# Patient Record
Sex: FEMALE | Race: WHITE | NOT HISPANIC OR LATINO | Employment: FULL TIME | URBAN - NONMETROPOLITAN AREA
[De-identification: names, ages, dates, MRNs, and addresses within clinical notes are randomized per-mention and may not be internally consistent; named-entity substitution may affect disease eponyms.]

---

## 2020-03-03 ENCOUNTER — HOSPITAL ENCOUNTER (EMERGENCY)
Facility: HOSPITAL | Age: 45
Discharge: HOME | End: 2020-03-03
Attending: EMERGENCY MEDICINE | Admitting: EMERGENCY MEDICINE

## 2020-03-03 ENCOUNTER — APPOINTMENT (OUTPATIENT)
Dept: GENERAL RADIOLOGY | Facility: HOSPITAL | Age: 45
End: 2020-03-03

## 2020-03-03 VITALS
SYSTOLIC BLOOD PRESSURE: 118 MMHG | HEART RATE: 92 BPM | HEIGHT: 62 IN | RESPIRATION RATE: 18 BRPM | BODY MASS INDEX: 29.44 KG/M2 | TEMPERATURE: 97.5 F | OXYGEN SATURATION: 98 % | DIASTOLIC BLOOD PRESSURE: 72 MMHG | WEIGHT: 160 LBS

## 2020-03-03 DIAGNOSIS — R55 VASOVAGAL EPISODE: ICD-10-CM

## 2020-03-03 DIAGNOSIS — R07.89 CHEST PAIN, ATYPICAL: Primary | ICD-10-CM

## 2020-03-03 LAB
ALBUMIN SERPL-MCNC: 4.54 G/DL (ref 3.5–5.2)
ALBUMIN/GLOB SERPL: 1.5 G/DL
ALP SERPL-CCNC: 96 U/L (ref 39–117)
ALT SERPL W P-5'-P-CCNC: 18 U/L (ref 1–33)
AMYLASE SERPL-CCNC: 33 U/L (ref 28–100)
ANION GAP SERPL CALCULATED.3IONS-SCNC: 13.9 MMOL/L (ref 5–15)
AST SERPL-CCNC: 19 U/L (ref 1–32)
BACTERIA UR QL AUTO: ABNORMAL /HPF
BASOPHILS # BLD AUTO: 0.05 10*3/MM3 (ref 0–0.2)
BASOPHILS NFR BLD AUTO: 0.4 % (ref 0–1.5)
BILIRUB SERPL-MCNC: 0.5 MG/DL (ref 0.2–1.2)
BILIRUB UR QL STRIP: NEGATIVE
BUN BLD-MCNC: 10 MG/DL (ref 6–20)
BUN/CREAT SERPL: 11.8 (ref 7–25)
CALCIUM SPEC-SCNC: 10.2 MG/DL (ref 8.6–10.5)
CHLORIDE SERPL-SCNC: 105 MMOL/L (ref 98–107)
CLARITY UR: CLEAR
CO2 SERPL-SCNC: 24.1 MMOL/L (ref 22–29)
COLOR UR: ABNORMAL
CREAT BLD-MCNC: 0.85 MG/DL (ref 0.57–1)
DEPRECATED RDW RBC AUTO: 42.4 FL (ref 37–54)
EOSINOPHIL # BLD AUTO: 0.16 10*3/MM3 (ref 0–0.4)
EOSINOPHIL NFR BLD AUTO: 1.1 % (ref 0.3–6.2)
ERYTHROCYTE [DISTWIDTH] IN BLOOD BY AUTOMATED COUNT: 12.1 % (ref 12.3–15.4)
GFR SERPL CREATININE-BSD FRML MDRD: 73 ML/MIN/1.73
GLOBULIN UR ELPH-MCNC: 3.1 GM/DL
GLUCOSE BLD-MCNC: 111 MG/DL (ref 65–99)
GLUCOSE UR STRIP-MCNC: NEGATIVE MG/DL
HCT VFR BLD AUTO: 47.5 % (ref 34–46.6)
HGB BLD-MCNC: 16.3 G/DL (ref 12–15.9)
HGB UR QL STRIP.AUTO: ABNORMAL
HOLD SPECIMEN: NORMAL
HOLD SPECIMEN: NORMAL
HYALINE CASTS UR QL AUTO: ABNORMAL /LPF
IMM GRANULOCYTES # BLD AUTO: 0.06 10*3/MM3 (ref 0–0.05)
IMM GRANULOCYTES NFR BLD AUTO: 0.4 % (ref 0–0.5)
KETONES UR QL STRIP: NEGATIVE
LEUKOCYTE ESTERASE UR QL STRIP.AUTO: NEGATIVE
LIPASE SERPL-CCNC: 17 U/L (ref 13–60)
LYMPHOCYTES # BLD AUTO: 1.14 10*3/MM3 (ref 0.7–3.1)
LYMPHOCYTES NFR BLD AUTO: 8.1 % (ref 19.6–45.3)
MCH RBC QN AUTO: 32.8 PG (ref 26.6–33)
MCHC RBC AUTO-ENTMCNC: 34.3 G/DL (ref 31.5–35.7)
MCV RBC AUTO: 95.6 FL (ref 79–97)
MONOCYTES # BLD AUTO: 0.73 10*3/MM3 (ref 0.1–0.9)
MONOCYTES NFR BLD AUTO: 5.2 % (ref 5–12)
NEUTROPHILS # BLD AUTO: 11.9 10*3/MM3 (ref 1.7–7)
NEUTROPHILS NFR BLD AUTO: 84.8 % (ref 42.7–76)
NITRITE UR QL STRIP: NEGATIVE
NRBC BLD AUTO-RTO: 0 /100 WBC (ref 0–0.2)
PH UR STRIP.AUTO: <=5 [PH] (ref 5–8)
PLATELET # BLD AUTO: 229 10*3/MM3 (ref 140–450)
PMV BLD AUTO: 11.5 FL (ref 6–12)
POTASSIUM BLD-SCNC: 4.1 MMOL/L (ref 3.5–5.2)
PROT SERPL-MCNC: 7.6 G/DL (ref 6–8.5)
PROT UR QL STRIP: ABNORMAL
RBC # BLD AUTO: 4.97 10*6/MM3 (ref 3.77–5.28)
RBC # UR: ABNORMAL /HPF
REF LAB TEST METHOD: ABNORMAL
SODIUM BLD-SCNC: 143 MMOL/L (ref 136–145)
SP GR UR STRIP: 1.02 (ref 1–1.03)
SQUAMOUS #/AREA URNS HPF: ABNORMAL /HPF
TROPONIN T SERPL-MCNC: <0.01 NG/ML (ref 0–0.03)
TROPONIN T SERPL-MCNC: <0.01 NG/ML (ref 0–0.03)
UROBILINOGEN UR QL STRIP: ABNORMAL
WBC NRBC COR # BLD: 14.04 10*3/MM3 (ref 3.4–10.8)
WBC UR QL AUTO: ABNORMAL /HPF
WHOLE BLOOD HOLD SPECIMEN: NORMAL
WHOLE BLOOD HOLD SPECIMEN: NORMAL

## 2020-03-03 PROCEDURE — 96361 HYDRATE IV INFUSION ADD-ON: CPT

## 2020-03-03 PROCEDURE — 93010 ELECTROCARDIOGRAM REPORT: CPT | Performed by: INTERNAL MEDICINE

## 2020-03-03 PROCEDURE — 85025 COMPLETE CBC W/AUTO DIFF WBC: CPT | Performed by: EMERGENCY MEDICINE

## 2020-03-03 PROCEDURE — 82150 ASSAY OF AMYLASE: CPT | Performed by: EMERGENCY MEDICINE

## 2020-03-03 PROCEDURE — 93005 ELECTROCARDIOGRAM TRACING: CPT | Performed by: EMERGENCY MEDICINE

## 2020-03-03 PROCEDURE — 84484 ASSAY OF TROPONIN QUANT: CPT | Performed by: EMERGENCY MEDICINE

## 2020-03-03 PROCEDURE — 71045 X-RAY EXAM CHEST 1 VIEW: CPT

## 2020-03-03 PROCEDURE — 71045 X-RAY EXAM CHEST 1 VIEW: CPT | Performed by: RADIOLOGY

## 2020-03-03 PROCEDURE — 25010000002 ONDANSETRON PER 1 MG: Performed by: EMERGENCY MEDICINE

## 2020-03-03 PROCEDURE — 99285 EMERGENCY DEPT VISIT HI MDM: CPT

## 2020-03-03 PROCEDURE — 80053 COMPREHEN METABOLIC PANEL: CPT | Performed by: EMERGENCY MEDICINE

## 2020-03-03 PROCEDURE — 96374 THER/PROPH/DIAG INJ IV PUSH: CPT

## 2020-03-03 PROCEDURE — 83690 ASSAY OF LIPASE: CPT | Performed by: EMERGENCY MEDICINE

## 2020-03-03 PROCEDURE — 96375 TX/PRO/DX INJ NEW DRUG ADDON: CPT

## 2020-03-03 PROCEDURE — 87086 URINE CULTURE/COLONY COUNT: CPT | Performed by: EMERGENCY MEDICINE

## 2020-03-03 PROCEDURE — 81001 URINALYSIS AUTO W/SCOPE: CPT | Performed by: EMERGENCY MEDICINE

## 2020-03-03 RX ORDER — SODIUM CHLORIDE 9 MG/ML
125 INJECTION, SOLUTION INTRAVENOUS CONTINUOUS
Status: DISCONTINUED | OUTPATIENT
Start: 2020-03-03 | End: 2020-03-03 | Stop reason: HOSPADM

## 2020-03-03 RX ORDER — ONDANSETRON 4 MG/1
4 TABLET, ORALLY DISINTEGRATING ORAL EVERY 6 HOURS PRN
Qty: 15 TABLET | Refills: 0 | Status: SHIPPED | OUTPATIENT
Start: 2020-03-03 | End: 2021-10-22

## 2020-03-03 RX ORDER — PANTOPRAZOLE SODIUM 40 MG/10ML
40 INJECTION, POWDER, LYOPHILIZED, FOR SOLUTION INTRAVENOUS ONCE
Status: COMPLETED | OUTPATIENT
Start: 2020-03-03 | End: 2020-03-03

## 2020-03-03 RX ORDER — SODIUM CHLORIDE 0.9 % (FLUSH) 0.9 %
10 SYRINGE (ML) INJECTION AS NEEDED
Status: DISCONTINUED | OUTPATIENT
Start: 2020-03-03 | End: 2020-03-03 | Stop reason: HOSPADM

## 2020-03-03 RX ORDER — PANTOPRAZOLE SODIUM 40 MG/1
40 TABLET, DELAYED RELEASE ORAL DAILY
Qty: 30 TABLET | Refills: 0 | Status: SHIPPED | OUTPATIENT
Start: 2020-03-03

## 2020-03-03 RX ORDER — ONDANSETRON 2 MG/ML
4 INJECTION INTRAMUSCULAR; INTRAVENOUS ONCE
Status: COMPLETED | OUTPATIENT
Start: 2020-03-03 | End: 2020-03-03

## 2020-03-03 RX ORDER — ALUMINA, MAGNESIA, AND SIMETHICONE 2400; 2400; 240 MG/30ML; MG/30ML; MG/30ML
15 SUSPENSION ORAL ONCE
Status: COMPLETED | OUTPATIENT
Start: 2020-03-03 | End: 2020-03-03

## 2020-03-03 RX ORDER — LIDOCAINE HYDROCHLORIDE 20 MG/ML
15 SOLUTION OROPHARYNGEAL ONCE
Status: COMPLETED | OUTPATIENT
Start: 2020-03-03 | End: 2020-03-03

## 2020-03-03 RX ADMIN — LIDOCAINE HYDROCHLORIDE 15 ML: 20 SOLUTION ORAL; TOPICAL at 09:30

## 2020-03-03 RX ADMIN — ONDANSETRON 4 MG: 2 INJECTION INTRAMUSCULAR; INTRAVENOUS at 09:28

## 2020-03-03 RX ADMIN — SODIUM CHLORIDE 125 ML/HR: 9 INJECTION, SOLUTION INTRAVENOUS at 09:28

## 2020-03-03 RX ADMIN — ALUMINUM HYDROXIDE, MAGNESIUM HYDROXIDE, AND DIMETHICONE 15 ML: 400; 400; 40 SUSPENSION ORAL at 09:30

## 2020-03-03 RX ADMIN — PANTOPRAZOLE SODIUM 40 MG: 40 INJECTION, POWDER, FOR SOLUTION INTRAVENOUS at 09:29

## 2020-03-03 RX ADMIN — SODIUM CHLORIDE 1000 ML: 9 INJECTION, SOLUTION INTRAVENOUS at 09:28

## 2020-03-03 NOTE — ED NOTES
Patient presents to the ER with complaints of chest pain that started this morning. Patient states that she woke up this morning and was having some heartburn. Patient states that she was having abdominal pain and began vomiting. Patient denies any chest pain upon arrival. Patient denies cardiac history. Patient is in no distress upon arrival. Patient vitals stable.      Declan Marx RN  03/03/20 [id number]

## 2020-03-03 NOTE — DISCHARGE INSTRUCTIONS
Home in care of family.  Rest.  Drink lots of fluids.  Take your medication as prescribed.  See your family doctor in the next couple days for recheck.  Return the emergency department right away if symptoms worsen/any problems.

## 2020-03-03 NOTE — ED NOTES
Patient states that she is chest pain free upon discharge. Patient and spouse verbalize understanding of discharge teaching. Patient vitals stable at time of discharge. Patient in no distress at time of discharge.      Declan Marx RN  03/03/20 [id number]

## 2020-03-03 NOTE — ED PROVIDER NOTES
"Subjective   Patient is a generally healthy 44-year-old female who presents with a chief complaint of chest pain and near syncope.  She reports that at about 7:00 this morning she developed burning substernal pain that seemed to be coming up from her epigastric area.  This did not radiate elsewhere.  She had no associated shortness of breath, nausea, vomiting, diaphoresis with this.  She then felt like she was about to have diarrhea, went to the bathroom, had a sudden large diarrheal stool, after which she states that she broke out in a cold sweat, got sick at her stomach and vomited, at which time she felt lightheaded as though she may lose consciousness, though she did not lose consciousness.  She feels much better by the time that she arrives here.  She reports mild burning epigastric and substernal discomfort, mild nausea.  She denies other symptoms or other complaints.  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  Patient has no history of diabetes, hypertension, coronary artery disease, or other major medical problems.  She does not take any medications regularly.  She does not smoke.          Review of Systems   Constitutional: Negative for chills and fever.   HENT: Negative for ear pain, sore throat and trouble swallowing.    Eyes: Negative for photophobia and pain.   Respiratory: Negative for shortness of breath and wheezing.    Cardiovascular: Positive for chest pain. Negative for palpitations.   Gastrointestinal: Positive for abdominal pain, nausea and vomiting. Negative for abdominal distention, blood in stool and diarrhea.   Endocrine: Negative for polydipsia and polyphagia.   Genitourinary: Negative for difficulty urinating and flank pain.   Musculoskeletal: Negative for back pain, neck pain and neck stiffness.   Skin: Negative for color change and pallor.   Neurological: Negative for seizures, syncope and speech " difficulty.   Psychiatric/Behavioral: Negative for confusion.   All other systems reviewed and are negative.      No past medical history on file.    No Known Allergies    No past surgical history on file.    No family history on file.    Social History     Socioeconomic History   • Marital status:      Spouse name: Not on file   • Number of children: Not on file   • Years of education: Not on file   • Highest education level: Not on file           Objective   Physical Exam   Constitutional: She is oriented to person, place, and time. She appears well-developed and well-nourished. No distress.   Very pleasant female, in no apparent distress.  She is in good spirits.  She appears well.   HENT:   Head: Normocephalic and atraumatic.   Eyes: Pupils are equal, round, and reactive to light. EOM are normal. No scleral icterus.   Neck: Normal range of motion. Neck supple. No neck rigidity. No tracheal deviation present.   Cardiovascular: Normal rate, regular rhythm and intact distal pulses.   Pulmonary/Chest: Effort normal and breath sounds normal. No respiratory distress. She exhibits no tenderness.   Abdominal: Soft. Bowel sounds are normal. There is no tenderness. There is no rebound and no guarding.   Musculoskeletal: Normal range of motion. She exhibits no tenderness.        Right lower leg: She exhibits no tenderness and no edema.        Left lower leg: She exhibits no tenderness and no edema.   Neurological: She is alert and oriented to person, place, and time. She has normal strength. No sensory deficit. She exhibits normal muscle tone. Coordination normal. GCS eye subscore is 4. GCS verbal subscore is 5. GCS motor subscore is 6.   Skin: Skin is warm and dry. Capillary refill takes less than 2 seconds. No cyanosis. No pallor.   Psychiatric: She has a normal mood and affect. Her behavior is normal.   Nursing note and vitals reviewed.      Procedures  EKG shows sinus rhythm with a rate of 99.  No apparent acute  ischemia.  Xr Chest 1 View    Result Date: 3/3/2020  Narrative: EXAMINATION: XR CHEST 1 VW-  CLINICAL INDICATION:     CP  TECHNIQUE:  XR CHEST 1 VW-  COMPARISON: NONE  FINDINGS: The lungs remain aerated. Heart and mediastinum contours are unremarkable. No pleural effusion. No pneumothorax. Bony and soft tissue structures are unremarkable.      Impression: No radiographic evidence of acute cardiac or pulmonary disease.  This report was finalized on 3/3/2020 9:17 AM by Dr. Ignacio Padron MD.      Results for orders placed or performed during the hospital encounter of 03/03/20   Comprehensive Metabolic Panel   Result Value Ref Range    Glucose 111 (H) 65 - 99 mg/dL    BUN 10 6 - 20 mg/dL    Creatinine 0.85 0.57 - 1.00 mg/dL    Sodium 143 136 - 145 mmol/L    Potassium 4.1 3.5 - 5.2 mmol/L    Chloride 105 98 - 107 mmol/L    CO2 24.1 22.0 - 29.0 mmol/L    Calcium 10.2 8.6 - 10.5 mg/dL    Total Protein 7.6 6.0 - 8.5 g/dL    Albumin 4.54 3.50 - 5.20 g/dL    ALT (SGPT) 18 1 - 33 U/L    AST (SGOT) 19 1 - 32 U/L    Alkaline Phosphatase 96 39 - 117 U/L    Total Bilirubin 0.5 0.2 - 1.2 mg/dL    eGFR Non African Amer 73 >60 mL/min/1.73    Globulin 3.1 gm/dL    A/G Ratio 1.5 g/dL    BUN/Creatinine Ratio 11.8 7.0 - 25.0    Anion Gap 13.9 5.0 - 15.0 mmol/L   Lipase   Result Value Ref Range    Lipase 17 13 - 60 U/L   Amylase   Result Value Ref Range    Amylase 33 28 - 100 U/L   Urinalysis With Microscopic If Indicated (No Culture) - Urine, Clean Catch   Result Value Ref Range    Color, UA Dark Yellow (A) Yellow, Straw    Appearance, UA Clear Clear    pH, UA <=5.0 5.0 - 8.0    Specific Gravity, UA 1.021 1.005 - 1.030    Glucose, UA Negative Negative    Ketones, UA Negative Negative    Bilirubin, UA Negative Negative    Blood, UA Trace (A) Negative    Protein, UA Trace (A) Negative    Leuk Esterase, UA Negative Negative    Nitrite, UA Negative Negative    Urobilinogen, UA 0.2 E.U./dL 0.2 - 1.0 E.U./dL   Troponin   Result Value Ref Range     Troponin T <0.010 0.000 - 0.030 ng/mL   Troponin   Result Value Ref Range    Troponin T <0.010 0.000 - 0.030 ng/mL   CBC Auto Differential   Result Value Ref Range    WBC 14.04 (H) 3.40 - 10.80 10*3/mm3    RBC 4.97 3.77 - 5.28 10*6/mm3    Hemoglobin 16.3 (H) 12.0 - 15.9 g/dL    Hematocrit 47.5 (H) 34.0 - 46.6 %    MCV 95.6 79.0 - 97.0 fL    MCH 32.8 26.6 - 33.0 pg    MCHC 34.3 31.5 - 35.7 g/dL    RDW 12.1 (L) 12.3 - 15.4 %    RDW-SD 42.4 37.0 - 54.0 fl    MPV 11.5 6.0 - 12.0 fL    Platelets 229 140 - 450 10*3/mm3    Neutrophil % 84.8 (H) 42.7 - 76.0 %    Lymphocyte % 8.1 (L) 19.6 - 45.3 %    Monocyte % 5.2 5.0 - 12.0 %    Eosinophil % 1.1 0.3 - 6.2 %    Basophil % 0.4 0.0 - 1.5 %    Immature Grans % 0.4 0.0 - 0.5 %    Neutrophils, Absolute 11.90 (H) 1.70 - 7.00 10*3/mm3    Lymphocytes, Absolute 1.14 0.70 - 3.10 10*3/mm3    Monocytes, Absolute 0.73 0.10 - 0.90 10*3/mm3    Eosinophils, Absolute 0.16 0.00 - 0.40 10*3/mm3    Basophils, Absolute 0.05 0.00 - 0.20 10*3/mm3    Immature Grans, Absolute 0.06 (H) 0.00 - 0.05 10*3/mm3    nRBC 0.0 0.0 - 0.2 /100 WBC   Urinalysis, Microscopic Only - Urine, Clean Catch   Result Value Ref Range    RBC, UA 3-5 (A) None Seen, 0-2 /HPF    WBC, UA 6-12 (A) None Seen, 0-2 /HPF    Bacteria, UA None Seen None Seen /HPF    Squamous Epithelial Cells, UA 0-2 None Seen, 0-2 /HPF    Hyaline Casts, UA 3-6 None Seen /LPF    Methodology Automated Microscopy    Light Blue Top   Result Value Ref Range    Extra Tube hold for add-on    Green Top (Gel)   Result Value Ref Range    Extra Tube Hold for add-ons.    Lavender Top   Result Value Ref Range    Extra Tube hold for add-on    Gold Top - SST   Result Value Ref Range    Extra Tube Hold for add-ons.                   ED Course  Patient's emergency department stay has been uneventful.  Never has she shown any signs of distress.  Patient, her  and I discussed all of her test results and her plan of care.  They voice understanding and  agreement.                                           MDM    Final diagnoses:   Chest pain, atypical   Vasovagal episode             Please note that portions of this note were completed with a voice recognition program.        Adam Mendes MD  03/03/20 [id number]

## 2020-03-04 LAB — BACTERIA SPEC AEROBE CULT: NORMAL

## 2020-12-15 ENCOUNTER — HOSPITAL ENCOUNTER (OUTPATIENT)
Dept: MAMMOGRAPHY | Facility: HOSPITAL | Age: 45
Discharge: HOME | End: 2020-12-15
Admitting: INTERNAL MEDICINE

## 2020-12-15 DIAGNOSIS — Z12.31 VISIT FOR SCREENING MAMMOGRAM: ICD-10-CM

## 2020-12-15 PROCEDURE — 77067 SCR MAMMO BI INCL CAD: CPT

## 2020-12-15 PROCEDURE — 77067 SCR MAMMO BI INCL CAD: CPT | Performed by: RADIOLOGY

## 2020-12-15 PROCEDURE — 77063 BREAST TOMOSYNTHESIS BI: CPT

## 2020-12-15 PROCEDURE — 77063 BREAST TOMOSYNTHESIS BI: CPT | Performed by: RADIOLOGY

## 2021-03-18 ENCOUNTER — BULK ORDERING (OUTPATIENT)
Dept: CASE MANAGEMENT | Facility: OTHER | Age: 46
End: 2021-03-18

## 2021-03-18 DIAGNOSIS — Z23 IMMUNIZATION DUE: ICD-10-CM

## 2021-10-05 ENCOUNTER — TRANSCRIBE ORDERS (OUTPATIENT)
Dept: ADMINISTRATIVE | Facility: HOSPITAL | Age: 46
End: 2021-10-05

## 2021-10-05 DIAGNOSIS — R10.9 ABDOMINAL PAIN, UNSPECIFIED ABDOMINAL LOCATION: Primary | ICD-10-CM

## 2021-10-20 ENCOUNTER — HOSPITAL ENCOUNTER (OUTPATIENT)
Dept: ULTRASOUND IMAGING | Facility: HOSPITAL | Age: 46
Discharge: HOME | End: 2021-10-20
Admitting: INTERNAL MEDICINE

## 2021-10-20 DIAGNOSIS — R10.9 ABDOMINAL PAIN, UNSPECIFIED ABDOMINAL LOCATION: ICD-10-CM

## 2021-10-20 PROCEDURE — 76775 US EXAM ABDO BACK WALL LIM: CPT | Performed by: RADIOLOGY

## 2021-10-20 PROCEDURE — 76775 US EXAM ABDO BACK WALL LIM: CPT

## 2021-10-22 ENCOUNTER — OFFICE VISIT (OUTPATIENT)
Dept: PSYCHIATRY | Facility: CLINIC | Age: 46
End: 2021-10-22

## 2021-10-22 VITALS
WEIGHT: 163 LBS | BODY MASS INDEX: 30 KG/M2 | HEART RATE: 79 BPM | HEIGHT: 62 IN | DIASTOLIC BLOOD PRESSURE: 82 MMHG | SYSTOLIC BLOOD PRESSURE: 124 MMHG

## 2021-10-22 DIAGNOSIS — F41.1 GENERALIZED ANXIETY DISORDER: ICD-10-CM

## 2021-10-22 DIAGNOSIS — Z79.899 MEDICATION MANAGEMENT: ICD-10-CM

## 2021-10-22 DIAGNOSIS — G47.00 INSOMNIA, UNSPECIFIED TYPE: ICD-10-CM

## 2021-10-22 DIAGNOSIS — F90.0 ADHD (ATTENTION DEFICIT HYPERACTIVITY DISORDER), INATTENTIVE TYPE: Primary | ICD-10-CM

## 2021-10-22 LAB
AMPHETAMINE CUT-OFF: NORMAL
BENZODIAZIPINE CUT-OFF: NORMAL
BUPRENORPHINE CUT-OFF: NORMAL
COCAINE CUT-OFF: NORMAL
EXTERNAL AMPHETAMINE SCREEN URINE: NEGATIVE
EXTERNAL BENZODIAZEPINE SCREEN URINE: NEGATIVE
EXTERNAL BUPRENORPHINE SCREEN URINE: NEGATIVE
EXTERNAL COCAINE SCREEN URINE: NEGATIVE
EXTERNAL MDMA: NEGATIVE
EXTERNAL METHADONE SCREEN URINE: NEGATIVE
EXTERNAL METHAMPHETAMINE SCREEN URINE: NEGATIVE
EXTERNAL OPIATES SCREEN URINE: NEGATIVE
EXTERNAL OXYCODONE SCREEN URINE: NEGATIVE
EXTERNAL THC SCREEN URINE: NEGATIVE
MDMA CUT-OFF: NORMAL
METHADONE CUT-OFF: NORMAL
METHAMPHETAMINE CUT-OFF: NORMAL
OPIATES CUT-OFF: NORMAL
OXYCODONE CUT-OFF: NORMAL
THC CUT-OFF: NORMAL

## 2021-10-22 PROCEDURE — 90792 PSYCH DIAG EVAL W/MED SRVCS: CPT | Performed by: NURSE PRACTITIONER

## 2021-10-22 RX ORDER — LEVOTHYROXINE SODIUM 0.03 MG/1
25 TABLET ORAL DAILY
COMMUNITY

## 2021-10-22 RX ORDER — LOSARTAN POTASSIUM 25 MG/1
25 TABLET ORAL DAILY
COMMUNITY
End: 2022-01-10

## 2021-10-22 RX ORDER — ESCITALOPRAM OXALATE 10 MG/1
10 TABLET ORAL DAILY
COMMUNITY

## 2021-10-22 RX ORDER — BUPROPION HYDROCHLORIDE 150 MG/1
150 TABLET ORAL EVERY MORNING
Qty: 30 TABLET | Refills: 1 | Status: SHIPPED | OUTPATIENT
Start: 2021-10-22 | End: 2021-11-22 | Stop reason: ALTCHOICE

## 2021-10-22 RX ORDER — ROSUVASTATIN CALCIUM 20 MG/1
20 TABLET, COATED ORAL DAILY
COMMUNITY
End: 2022-01-10 | Stop reason: SDUPTHER

## 2021-10-22 RX ORDER — ATENOLOL 25 MG/1
12.5 TABLET ORAL DAILY
COMMUNITY
End: 2022-01-10 | Stop reason: SDUPTHER

## 2021-10-22 RX ORDER — BUSPIRONE HYDROCHLORIDE 5 MG/1
5 TABLET ORAL 3 TIMES DAILY
COMMUNITY
End: 2021-10-22 | Stop reason: SDUPTHER

## 2021-10-22 RX ORDER — BUSPIRONE HYDROCHLORIDE 5 MG/1
5 TABLET ORAL 2 TIMES DAILY
Qty: 60 TABLET | Refills: 1 | Status: SHIPPED | OUTPATIENT
Start: 2021-10-22

## 2021-10-22 NOTE — PROGRESS NOTES
"Subjective   Jany Mendes is a 46 y.o. female who is here today for initial appointment to evaluate for medication options.     Chief Complaint:  Difficulty with focus    HPI: Patient presents as initial evaluation. States she has struggled most of her life with focusing and being easily distracted. States in grade and high school she was label as the kid that talked a lot, disrupted the class and was put in the pitts and eventually the class clown. 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 Reports her  and mother encouraged her to come to appointment today for evaluation. States \"I'm everywhere all the time\". States she feels like her mind never slows down. Reports she has to set alarms to make sure she has turned the stove and dryer off. Reports if the family is in the car and talking, she cannot listen to music as it gets her distracted while driving. Reports if she is in [employer] and sitting behind someone who is \"theatrical\" she cannot focus on singing as all she does is watch that person.   Reports her PCP did start her on buspirone and escitalopram for anxiety which has been somewhat helpful, however the buspirone was prescribed as PRN.  The patient endorses symptoms of ADHD including, but not limited to: careless mistakes or not paying attention to directions or people of authority, trouble keeping attention on tasks and during hobbies or leisure activities, does not follow instructions and fails to finish homework chores daily tasks or duties at work, trouble organizing activities, avoids dislikes or doesn't want to do things that require mental effort for a long period of time, loses things needed for tasks, easily distracted, forgetful in daily activities, often fidgets with hands or feet or squirms in seat, often is restless, often gets up from seat and moves around when remaining in seat is expected, often talks " excessively, often blurts out answers before questions have been finished and often has trouble waiting one's turn which have caused impairment in important areas of daily functioning.  The patient has had symptoms of ADHD for 20 years, which have worsened over the last 3.  The patient/guardian rates their ADHD at a 7/10 on a 0-10 scale, with 10 being the worst.  The patient reports the following symptoms of anxiety: constant anxiety/worry, restlessness/on edge, difficulty concentrating, mind goes blank, irritability, muscle tension and sleep disturbance and have caused impairment in important areas of functioning. Anxiety rated 5/10 with 10 being the worst.   She denies any symptoms of rosy or OCD.  Reports difficulty falling asleep, states she has racing thoughts that prevent her from being able to sleep. Reports appetite is good. She denies SI/HI/AVH.          The following portions of the patient's history were reviewed and updated as appropriate: allergies, current medications, past family history, past medical history, past social history, past surgical history and problem list.    Past Psych History: None     Previous Psych Meds: Escitalopram, buspirone     Substance Abuse: None    Social History: Patient born and raised in [place] where she currently resides with her  of 25 years.  She has 3 children ages 19, 23, and 25.  She has a bachelor's in nursing, works for [employer].  Denies any legal issues.  Was never in the .  She believes in God and is of the [employer].    Family History:  Family History   Problem Relation Age of Onset   • Breast cancer Mother 70       Past Surgical History:  Past Surgical History:   Procedure Laterality Date   • APPENDECTOMY     •  SECTION     •  SECTION     • CHOLECYSTECTOMY     • HYSTERECTOMY             Problem List:  There is no problem list on file for this patient.      Allergy:  No Known Allergies      Current  "Medications:   Current Outpatient Medications   Medication Sig Dispense Refill   • atenolol (TENORMIN) 25 MG tablet Take 25 mg by mouth Daily.     • busPIRone (BUSPAR) 5 MG tablet Take 1 tablet by mouth 2 (two) times a day. 60 tablet 1   • escitalopram (LEXAPRO) 10 MG tablet Take 10 mg by mouth Daily.     • folic acid-vit B6-vit B12 (FOLTABS) 0.8-10-0.115 MG tablet tablet Take  by mouth Daily.     • levothyroxine (SYNTHROID, LEVOTHROID) 25 MCG tablet Take 25 mcg by mouth Daily.     • losartan (COZAAR) 25 MG tablet Take 25 mg by mouth Daily.     • pantoprazole (PROTONIX) 40 MG EC tablet Take 1 tablet by mouth Daily. 30 tablet 0   • rosuvastatin (CRESTOR) 20 MG tablet Take 20 mg by mouth Daily.     • buPROPion XL (Wellbutrin XL) 150 MG 24 hr tablet Take 1 tablet by mouth Every Morning. 30 tablet 1     No current facility-administered medications for this visit.       Review of Systems  Review of Systems   Constitutional: Negative.    HENT: Negative.    Eyes: Negative.    Respiratory: Negative.    Cardiovascular: Positive for palpitations.   Gastrointestinal: Negative.    Genitourinary: Negative.    Musculoskeletal: Negative.    Skin: Negative.    Neurological: Negative.    Psychiatric/Behavioral: Positive for decreased concentration, sleep disturbance and stress. Negative for suicidal ideas. The patient is nervous/anxious.        Objective   Physical Exam  Blood pressure 124/82, pulse 79, height 157.5 cm (62\"), weight 73.9 kg (163 lb).    Appearance: Well nourished female, appropriately dressed, appears stated age and in no acute distress  Gait, Station, Strength: WNL    Mental Status Exam:   Hygiene:   good  Cooperation:  Cooperative  Eye Contact:  Good  Psychomotor Behavior:  Appropriate  Affect:  Appropriate  Hopelessness: Denies  Speech:  Rambling  Thought Process:  Goal directed and Linear  Thought Content:  Normal and Mood congruent  Suicidal:  None  Homicidal:  None  Hallucinations:  None  Delusion:  " None  Memory:  Intact  Orientation:  Person, Place, Time and Situation  Reliability:  good  Insight:  Good  Judgement:  Good  Impulse Control:  Good  Physical/Medical Issues:  No     PHQ-Score Total:  PHQ-9 Total Score: 9    Patient screened positive for depression based on a PHQ-9 score of 9 on 10/22/2021. Follow-up recommendations include: Prescribed antidepressant medication treatment and Suicide Risk Assessment performed.        Lab Results:   Office Visit on 10/22/2021   Component Date Value Ref Range Status   • External Amphetamine Screen Urine 10/22/2021 Negative   Final   • Amphetamine Cut-Off 10/22/2021 1000ng/ml   Final   • External Benzodiazepine Screen Uri* 10/22/2021 Negative   Final   • Benzodiazipine Cut-Off 10/22/2021 300ng/ml   Final   • External Cocaine Screen Urine 10/22/2021 Negative   Final   • Cocaine Cut-Off 10/22/2021 300ng/ml   Final   • External THC Screen Urine 10/22/2021 Negative   Final   • THC Cut-Off 10/22/2021 50ng/ml   Final   • External Methadone Screen Urine 10/22/2021 Negative   Final   • Methadone Cut-Off 10/22/2021 300ng/ml   Final   • External Methamphetamine Screen Ur* 10/22/2021 Negative   Final   • Methamphetamine Cut-Off 10/22/2021 1000ng/ml   Final   • External Oxycodone Screen Urine 10/22/2021 Negative   Final   • Oxycodone Cut-Off 10/22/2021 100ng/ml   Final   • External Buprenorphine Screen Urine 10/22/2021 Negative   Final   • Buprenorphine Cut-Off 10/22/2021 10ng/ml   Final   • External MDMA 10/22/2021 Negative   Final   • MDMA Cut-Off 10/22/2021 500ng/ml   Final   • External Opiates Screen Urine 10/22/2021 Negative   Final   • Opiates Cut-Off 10/22/2021 300ng/ml   Final       Assessment/Plan   Diagnoses and all orders for this visit:    1. ADHD (attention deficit hyperactivity disorder), inattentive type (Primary)  -     buPROPion XL (Wellbutrin XL) 150 MG 24 hr tablet; Take 1 tablet by mouth Every Morning.  Dispense: 30 tablet; Refill: 1    2. Medication  management  -     [facility] Screen    3. Generalized anxiety disorder  -     buPROPion XL (Wellbutrin XL) 150 MG 24 hr tablet; Take 1 tablet by mouth Every Morning.  Dispense: 30 tablet; Refill: 1  -     busPIRone (BUSPAR) 5 MG tablet; Take 1 tablet by mouth 2 (two) times a day.  Dispense: 60 tablet; Refill: 1    4. Insomnia, unspecified type      -We will have patient take CPT test to confirm ADHD diagnosis.  She does report having EKG changes and is going to see cardiologist next month  -Continue escitalopram 10 mg daily for anxiety and depression  -Begin bupropion  mg daily for off label use to help with focus and attention  -Continue buspirone 5 mg twice daily for anxiety.  Discussed with patient to achieve maximum efficacy medication needs to be taken daily  -Encouraged patient to begin psychotherapy  -UDS negative  -We discussed sleep hygiene including going to bed at the same time and getting up at the same time every day, decreased caffeine consumption, going to bed early enough to get 7 or 8 hours in bed, reading and relaxing before bedtime, and avoiding the TV, computer, phone, iPad close to bedtime.  LANETTE reviewed and appropriate. Patient counseled on use of controlled substances.   -The benefits of a healthy diet and exercise were discussed with patient, especially the positive effects they have on mental health. Patient encouraged to consider lifestyle modification regarding  diet and exercise patterns to maximize results of mental health treatment.  -Reviewed previous available documentation  -Reviewed most recent available labs                Visit Diagnoses:    ICD-10-CM ICD-9-CM   1. ADHD (attention deficit hyperactivity disorder), inattentive type  F90.0 314.00   2. Medication management  Z79.899 V58.69   3. Generalized anxiety disorder  F41.1 300.02   4. Insomnia, unspecified type  G47.00 780.52       TREATMENT PLAN/GOALS: Continue supportive psychotherapy efforts and medications as  indicated. Treatment and medication options discussed during today's visit. Patient acknowledged and verbally consented to continue with current treatment plan and was educated on the importance of compliance with treatment and follow-up appointments.    MEDICATION ISSUES:  Discussed medication options and treatment plan of prescribed medication as well as the risks, benefits, and side effects including potential falls, possible impaired driving and metabolic adversities among others. Patient is agreeable to call the office with any worsening of symptoms or onset of side effects. Patient is agreeable to call 911 or go to the nearest ER should he/she begin having SI/HI.     MEDS ORDERED DURING VISIT:  New Medications Ordered This Visit   Medications   • buPROPion XL (Wellbutrin XL) 150 MG 24 hr tablet     Sig: Take 1 tablet by mouth Every Morning.     Dispense:  30 tablet     Refill:  1   • busPIRone (BUSPAR) 5 MG tablet     Sig: Take 1 tablet by mouth 2 (two) times a day.     Dispense:  60 tablet     Refill:  1     Return in about 8 weeks (around 12/17/2021).         Prognosis: Guarded dependent on medication/follow up and treatment plan compliance.  Functionality: pt showing improvements in important areas of daily functioning.     Short-term goals: Patient will adhere to medication regimen and note continued improvement in symptoms over the next 3 months.   Long-term goals: Patient will be adherent to medication management and psychotherapy with continued improvement in symptoms over the next 6 months          This document has been electronically signed by GENE Richter   October 25, 2021 09:23 EDT    Part of this note may be an electronic transcription/translation of spoken language to printed text using the Dragon Dictation System.

## 2021-11-22 ENCOUNTER — OFFICE VISIT (OUTPATIENT)
Dept: CARDIOLOGY | Facility: CLINIC | Age: 46
End: 2021-11-22

## 2021-11-22 VITALS
BODY MASS INDEX: 31.06 KG/M2 | SYSTOLIC BLOOD PRESSURE: 135 MMHG | HEART RATE: 74 BPM | DIASTOLIC BLOOD PRESSURE: 82 MMHG | WEIGHT: 168.8 LBS | TEMPERATURE: 97.3 F | HEIGHT: 62 IN

## 2021-11-22 DIAGNOSIS — I10 ESSENTIAL HYPERTENSION: ICD-10-CM

## 2021-11-22 DIAGNOSIS — G47.33 OSA (OBSTRUCTIVE SLEEP APNEA): ICD-10-CM

## 2021-11-22 DIAGNOSIS — F17.210 CIGARETTE SMOKER: ICD-10-CM

## 2021-11-22 DIAGNOSIS — R00.2 PALPITATIONS: ICD-10-CM

## 2021-11-22 DIAGNOSIS — E78.5 HYPERLIPIDEMIA LDL GOAL <100: ICD-10-CM

## 2021-11-22 DIAGNOSIS — R07.2 PRECORDIAL CHEST PAIN: Primary | ICD-10-CM

## 2021-11-22 DIAGNOSIS — R94.31 ABNORMAL EKG: ICD-10-CM

## 2021-11-22 PROCEDURE — 93000 ELECTROCARDIOGRAM COMPLETE: CPT | Performed by: SPECIALIST

## 2021-11-22 PROCEDURE — 99204 OFFICE O/P NEW MOD 45 MIN: CPT | Performed by: SPECIALIST

## 2021-11-22 NOTE — PROGRESS NOTES
Subjective   Initial consultation, palpitations, chest pain  Jany Mendes is a 46 y.o. female who presents to day for Palpitations (X 4 MONTHS).    CHIEF COMPLIANT  Chief Complaint   Patient presents with   • Palpitations     X 4 MONTHS       Active Problems:  Problem List Items Addressed This Visit        Cardiac and Vasculature    Precordial chest pain - Primary    Relevant Orders    Stress Test With Myocardial Perfusion One Day    Adult Transthoracic Echo Complete w/ Color, Spectral and Contrast if necessary per protocol    Cardiac Event Monitor    Palpitations    Relevant Orders    Adult Transthoracic Echo Complete w/ Color, Spectral and Contrast if necessary per protocol    Abnormal EKG    Essential hypertension    Hyperlipidemia LDL goal <100       Tobacco    Cigarette smoker      Other Visit Diagnoses     CHIKA (obstructive sleep apnea)        Relevant Orders    Home Sleep Study          HPI  HPI  The last 4 months or so she has been having intermittent retrosternal chest pain usually does not last for long but it comes and suspects accessions on and off not particular risk to exertion or food at the same time also she is noted that she is having palpitations with heart racing on and off which she can last for hours sometimes she denied shortness of breath no edema no dizziness or syncope she used to be a heavy smoker she just quitted [employer] fourth smoked for 26 years a pack a day also she says she has a very high cholesterol and just last month he started on rosuvastatin she also has hypertension but no diagnosed diabetes she has strong family history of coronary disease her father had quadruple bypass surgery at age 47 also her paternal grandfather has also coronary artery disease with heart attack in his 40s, her mother has hypertension he feels tired all day long and sleepy and she also snores at night her mother has history of sleep apnea  PRIOR MEDS  Current Outpatient Medications on File Prior to  "Visit   Medication Sig Dispense Refill   • atenolol (TENORMIN) 25 MG tablet Take 12.5 mg by mouth Daily.     • busPIRone (BUSPAR) 5 MG tablet Take 1 tablet by mouth 2 (two) times a day. 60 tablet 1   • escitalopram (LEXAPRO) 10 MG tablet Take 10 mg by mouth Daily.     • folic acid-vit B6-vit B12 (FOLTABS) 0.8-10-0.115 MG tablet tablet Take  by mouth Daily.     • levothyroxine (SYNTHROID, LEVOTHROID) 25 MCG tablet Take 25 mcg by mouth Daily.     • losartan (COZAAR) 25 MG tablet Take 25 mg by mouth Daily.     • pantoprazole (PROTONIX) 40 MG EC tablet Take 1 tablet by mouth Daily. 30 tablet 0   • rosuvastatin (CRESTOR) 20 MG tablet Take 20 mg by mouth Daily.     • [DISCONTINUED] buPROPion XL (Wellbutrin XL) 150 MG 24 hr tablet Take 1 tablet by mouth Every Morning. 30 tablet 1     No current facility-administered medications on file prior to visit.       ALLERGIES  Patient has no known allergies.    HISTORY  Past Medical History:   Diagnosis Date   • Anxiety    • Hypertension        Social History     Socioeconomic History   • Marital status:    Tobacco Use   • Smoking status: Former Smoker   • Tobacco comment: States she hasn't in three weeks   Vaping Use   • Vaping Use: Never used   Substance and Sexual Activity   • Alcohol use: Yes     Comment: socially   • Drug use: Never       Family History   Problem Relation Age of Onset   • Breast cancer Mother 70       Review of Systems   Respiratory: Positive for chest tightness. Negative for apnea, cough, choking, shortness of breath, wheezing and stridor.    Cardiovascular: Positive for palpitations. Negative for chest pain and leg swelling.       Objective     VITALS: /82   Pulse 74   Temp 97.3 °F (36.3 °C)   Ht 157.5 cm (62.01\")   Wt 76.6 kg (168 lb 12.8 oz)   BMI 30.87 kg/m²     LABS:   Lab Results (most recent)     None          IMAGING:   US Aorta Limited    Result Date: 10/20/2021  No sonographic findings of abdominal aortic aneurysm.  This report was " finalized on 10/20/2021 3:58 PM by Dr. Hamilton Lindsay II, MD.        EXAM:  Physical Exam  Vitals reviewed.   Constitutional:       Appearance: She is well-developed.   HENT:      Head: Normocephalic and atraumatic.   Eyes:      Pupils: Pupils are equal, round, and reactive to light.   Neck:      Thyroid: No thyromegaly.      Vascular: No JVD.      Comments: Goitre    Cardiovascular:      Rate and Rhythm: Normal rate and regular rhythm.      Heart sounds: Normal heart sounds. No murmur heard.  No friction rub. No gallop.    Pulmonary:      Effort: Pulmonary effort is normal. No respiratory distress.      Breath sounds: Normal breath sounds. No stridor. No wheezing or rales.   Chest:      Chest wall: No tenderness.   Musculoskeletal:         General: No tenderness or deformity.      Cervical back: Neck supple.   Skin:     General: Skin is warm and dry.   Neurological:      Mental Status: She is alert and oriented to person, place, and time.      Cranial Nerves: No cranial nerve deficit.      Coordination: Coordination normal.         Procedure     ECG 12 Lead    Date/Time: 11/22/2021 1:27 PM  Performed by: Liam Hensley MD  Authorized by: Liam Hensley MD           EKG: Normal sinus rhythm with nonspecific left ventricular and the conduction delay comparing with the EKG on 3/3/2020 the left ventricle ventricular conduction delay is new       Assessment/Plan     Diagnoses and all orders for this visit:    1. Precordial chest pain (Primary)  -     Stress Test With Myocardial Perfusion One Day; Future  -     Adult Transthoracic Echo Complete w/ Color, Spectral and Contrast if necessary per protocol; Future  -     Cardiac Event Monitor; Future    2. Palpitations  -     Adult Transthoracic Echo Complete w/ Color, Spectral and Contrast if necessary per protocol; Future    3. Hyperlipidemia LDL goal <100    4. Essential hypertension    5. Abnormal EKG    6. Cigarette smoker    7. CHIKA (obstructive sleep apnea)  -     Home  Sleep Study; Future    Other orders  -     Cancel: ECG 12 Lead    1.  She has chest pain with typical and atypical features her EKG showed left ventricular end of conduction delay and she has multiple risk factors including very high lipid profile as well as history of smoking and family history some going to proceed with stress testing for assessment of ischemia  2.  I am going to get an echocardiogram to assess her cardiac function wall motion valve morphology and also an event monitor for assessment of arrhythmia  3.  I reviewed the labs from her primary care physician with very high lipids with LDL of 187 HDL of 49 and triglycerides 188 she started the rosuvastatin last month  4.  She quit smoking just recently and I congratulated her about the  5.  Blood pressure is borderline but this is her first visit so we will monitor for now  6.  She has symptoms consistent with sleep apnea also has a family history of sleep apnea will refer her for home sleep study  Return after stress test.             MEDS ORDERED DURING VISIT:  No orders of the defined types were placed in this encounter.      As always, Brittnee Magaña MD  I appreciate very much the opportunity to participate in the cardiovascular care of your patients. Please do not hesitate to call me with any questions with regards to Jany Mendes evaluation and management.         This document has been electronically signed by Liam Hensley MD  November 22, 2021 13:25 EST

## 2021-11-22 NOTE — PROGRESS NOTES
Brittnee Magaña MD  Jany Mendes  1975  2021    There is no problem list on file for this patient.      Dear Brittnee Magaña MD:    Subjective     Jany Mendes is a 46 y.o. female with the problems as listed above, presents    History of Present Illness:    Cardiac risk factors:{Causes; risk factors atherosclerosis:65356}    No Known Allergies:      Current Outpatient Medications:   •  atenolol (TENORMIN) 25 MG tablet, Take 25 mg by mouth Daily., Disp: , Rfl:   •  buPROPion XL (Wellbutrin XL) 150 MG 24 hr tablet, Take 1 tablet by mouth Every Morning., Disp: 30 tablet, Rfl: 1  •  busPIRone (BUSPAR) 5 MG tablet, Take 1 tablet by mouth 2 (two) times a day., Disp: 60 tablet, Rfl: 1  •  escitalopram (LEXAPRO) 10 MG tablet, Take 10 mg by mouth Daily., Disp: , Rfl:   •  folic acid-vit B6-vit B12 (FOLTABS) 0.8-10-0.115 MG tablet tablet, Take  by mouth Daily., Disp: , Rfl:   •  levothyroxine (SYNTHROID, LEVOTHROID) 25 MCG tablet, Take 25 mcg by mouth Daily., Disp: , Rfl:   •  losartan (COZAAR) 25 MG tablet, Take 25 mg by mouth Daily., Disp: , Rfl:   •  pantoprazole (PROTONIX) 40 MG EC tablet, Take 1 tablet by mouth Daily., Disp: 30 tablet, Rfl: 0  •  rosuvastatin (CRESTOR) 20 MG tablet, Take 20 mg by mouth Daily., Disp: , Rfl:     Past Medical History:   Diagnosis Date   • Anxiety    • Hypertension      Past Surgical History:   Procedure Laterality Date   • APPENDECTOMY     •  SECTION     •  SECTION     • CHOLECYSTECTOMY     • HYSTERECTOMY      2016     Family History   Problem Relation Age of Onset   • Breast cancer Mother 70     Social History     Tobacco Use   • Smoking status: Former Smoker   • Smokeless tobacco: Not on file   • Tobacco comment: States she hasn't in three weeks   Vaping Use   • Vaping Use: Never used   Substance Use Topics   • Alcohol use: Yes     Comment: socially   • Drug use: Never       Review of Systems   Cardiovascular: Positive for palpitations.       Objective  "  Temperature 97.3 °F (36.3 °C), height 157.5 cm (62.01\").  Body mass index is 29.81 kg/m².        Physical Exam    Lab Results   Component Value Date     03/03/2020    K 4.1 03/03/2020     03/03/2020    CO2 24.1 03/03/2020    BUN 10 03/03/2020    CREATININE 0.85 03/03/2020    GLUCOSE 111 (H) 03/03/2020    CALCIUM 10.2 03/03/2020    AST 19 03/03/2020    ALT 18 03/03/2020    ALKPHOS 96 03/03/2020    LABIL2 1.6 02/02/2015     No results found for: CKTOTAL  Lab Results   Component Value Date    WBC 14.04 (H) 03/03/2020    HGB 16.3 (H) 03/03/2020    HCT 47.5 (H) 03/03/2020     03/03/2020     No results found for: INR  No results found for: MG  No results found for: TSH, PSA, CHLPL, TRIG, HDL, LDL   No results found for: BNP    During this visit the following were done:  Labs Reviewed [x]    Labs Ordered []    Radiology Reports Reviewed [x]    Radiology Ordered []    PCP Records Reviewed []    Referring Provider Records Reviewed []    ER Records Reviewed [x]    Hospital Records Reviewed [x]    History Obtained From Family []    Radiology Images Reviewed [x]    Other Reviewed [x]    Records Requested []        ECG 12 Lead    Date/Time: 11/22/2021 12:18 PM  Performed by: Liam Hensley MD  Authorized by: Liam Hensley MD                 Assessment/Plan   No diagnosis found.                   Recommendations:    No orders of the defined types were placed in this encounter.       1.     No follow-ups on file.    As always, I appreciate very much the opportunity to participate in the cardiovascular care of your patients.      With Best Regards,    Zenon Cordoba MD, [facility]    Dragon disclaimer:  Much of this encounter note is an electronic transcription/translation of spoken language to printed text. The electronic translation of spoken language may permit erroneous, or at times, nonsensical words or phrases to be inadvertently transcribed; Although I have reviewed the note for such errors, some may still " exist.

## 2021-11-30 ENCOUNTER — TREATMENT (OUTPATIENT)
Dept: CARDIOLOGY | Facility: CLINIC | Age: 46
End: 2021-11-30

## 2021-11-30 DIAGNOSIS — R07.2 PRECORDIAL CHEST PAIN: ICD-10-CM

## 2021-12-01 PROCEDURE — 93228 REMOTE 30 DAY ECG REV/REPORT: CPT | Performed by: SPECIALIST

## 2021-12-09 ENCOUNTER — HOSPITAL ENCOUNTER (OUTPATIENT)
Dept: NUCLEAR MEDICINE | Facility: HOSPITAL | Age: 46
Discharge: HOME | End: 2021-12-09

## 2021-12-09 ENCOUNTER — HOSPITAL ENCOUNTER (OUTPATIENT)
Dept: CARDIOLOGY | Facility: HOSPITAL | Age: 46
Discharge: HOME | End: 2021-12-09

## 2021-12-09 DIAGNOSIS — R00.2 PALPITATIONS: ICD-10-CM

## 2021-12-09 DIAGNOSIS — R07.2 PRECORDIAL CHEST PAIN: ICD-10-CM

## 2021-12-09 LAB
BH CV ECHO MEAS - % IVS THICK: -10.3 %
BH CV ECHO MEAS - % LVPW THICK: 33.3 %
BH CV ECHO MEAS - ACS: 2.3 CM
BH CV ECHO MEAS - AO MAX PG: 6.4 MMHG
BH CV ECHO MEAS - AO MEAN PG: 3 MMHG
BH CV ECHO MEAS - AO ROOT AREA (BSA CORRECTED): 1.9
BH CV ECHO MEAS - AO ROOT AREA: 9.1 CM^2
BH CV ECHO MEAS - AO ROOT DIAM: 3.4 CM
BH CV ECHO MEAS - AO V2 MAX: 126 CM/SEC
BH CV ECHO MEAS - AO V2 MEAN: 84.3 CM/SEC
BH CV ECHO MEAS - AO V2 VTI: 24.8 CM
BH CV ECHO MEAS - BSA(HAYCOCK): 1.9 M^2
BH CV ECHO MEAS - BSA: 1.8 M^2
BH CV ECHO MEAS - BZI_BMI: 30.7 KILOGRAMS/M^2
BH CV ECHO MEAS - BZI_METRIC_HEIGHT: 157.5 CM
BH CV ECHO MEAS - BZI_METRIC_WEIGHT: 76.2 KG
BH CV ECHO MEAS - EDV(CUBED): 83.7 ML
BH CV ECHO MEAS - EDV(MOD-SP4): 83 ML
BH CV ECHO MEAS - EDV(TEICH): 86.5 ML
BH CV ECHO MEAS - EF(CUBED): 75 %
BH CV ECHO MEAS - EF(MOD-SP4): 63 %
BH CV ECHO MEAS - EF(TEICH): 67.2 %
BH CV ECHO MEAS - ESV(CUBED): 20.9 ML
BH CV ECHO MEAS - ESV(MOD-SP4): 30.7 ML
BH CV ECHO MEAS - ESV(TEICH): 28.4 ML
BH CV ECHO MEAS - FS: 37 %
BH CV ECHO MEAS - IVS/LVPW: 1.1
BH CV ECHO MEAS - IVSD: 1.2 CM
BH CV ECHO MEAS - IVSS: 1 CM
BH CV ECHO MEAS - LA DIMENSION: 3 CM
BH CV ECHO MEAS - LA/AO: 0.88
BH CV ECHO MEAS - LV DIASTOLIC VOL/BSA (35-75): 46.8 ML/M^2
BH CV ECHO MEAS - LV MASS(C)D: 173.4 GRAMS
BH CV ECHO MEAS - LV MASS(C)DI: 97.7 GRAMS/M^2
BH CV ECHO MEAS - LV MASS(C)S: 103.9 GRAMS
BH CV ECHO MEAS - LV MASS(C)SI: 58.6 GRAMS/M^2
BH CV ECHO MEAS - LV SYSTOLIC VOL/BSA (12-30): 17.3 ML/M^2
BH CV ECHO MEAS - LVIDD: 4.4 CM
BH CV ECHO MEAS - LVIDS: 2.8 CM
BH CV ECHO MEAS - LVLD AP4: 7 CM
BH CV ECHO MEAS - LVLS AP4: 5.6 CM
BH CV ECHO MEAS - LVOT AREA (M): 2.5 CM^2
BH CV ECHO MEAS - LVOT AREA: 2.5 CM^2
BH CV ECHO MEAS - LVOT DIAM: 1.8 CM
BH CV ECHO MEAS - LVPWD: 1.1 CM
BH CV ECHO MEAS - LVPWS: 1.5 CM
BH CV ECHO MEAS - MV A MAX VEL: 84.4 CM/SEC
BH CV ECHO MEAS - MV E MAX VEL: 90.8 CM/SEC
BH CV ECHO MEAS - MV E/A: 1.1
BH CV ECHO MEAS - PA ACC TIME: 0.1 SEC
BH CV ECHO MEAS - PA PR(ACCEL): 36.3 MMHG
BH CV ECHO MEAS - SI(AO): 126.9 ML/M^2
BH CV ECHO MEAS - SI(CUBED): 35.4 ML/M^2
BH CV ECHO MEAS - SI(MOD-SP4): 29.5 ML/M^2
BH CV ECHO MEAS - SI(TEICH): 32.7 ML/M^2
BH CV ECHO MEAS - SV(AO): 225.2 ML
BH CV ECHO MEAS - SV(CUBED): 62.8 ML
BH CV ECHO MEAS - SV(MOD-SP4): 52.3 ML
BH CV ECHO MEAS - SV(TEICH): 58.1 ML
BH CV REST NUCLEAR ISOTOPE DOSE: 9.8 MCI
BH CV STRESS BP STAGE 1: NORMAL
BH CV STRESS DURATION MIN STAGE 1: 3
BH CV STRESS DURATION MIN STAGE 2: 2
BH CV STRESS DURATION SEC STAGE 1: 0
BH CV STRESS DURATION SEC STAGE 2: 40
BH CV STRESS GRADE STAGE 1: 10
BH CV STRESS GRADE STAGE 2: 12
BH CV STRESS HR STAGE 1: 123
BH CV STRESS HR STAGE 2: 154
BH CV STRESS METS STAGE 1: 5
BH CV STRESS METS STAGE 2: 7.5
BH CV STRESS NUCLEAR ISOTOPE DOSE: 28.9 MCI
BH CV STRESS PROTOCOL 1: NORMAL
BH CV STRESS RECOVERY BP: NORMAL MMHG
BH CV STRESS RECOVERY HR: 98 BPM
BH CV STRESS SPEED STAGE 1: 1.7
BH CV STRESS SPEED STAGE 2: 2.5
BH CV STRESS STAGE 1: 1
BH CV STRESS STAGE 2: 2
LV EF NUC BP: 70 %
MAXIMAL PREDICTED HEART RATE: 174 BPM
MAXIMAL PREDICTED HEART RATE: 174 BPM
PERCENT MAX PREDICTED HR: 88.51 %
STRESS BASELINE BP: NORMAL MMHG
STRESS BASELINE HR: 77 BPM
STRESS PERCENT HR: 104 %
STRESS POST ESTIMATED WORKLOAD: 7 METS
STRESS POST EXERCISE DUR MIN: 5 MIN
STRESS POST EXERCISE DUR SEC: 40 SEC
STRESS POST PEAK BP: NORMAL MMHG
STRESS POST PEAK HR: 154 BPM
STRESS TARGET HR: 148 BPM
STRESS TARGET HR: 148 BPM

## 2021-12-09 PROCEDURE — 93016 CV STRESS TEST SUPVJ ONLY: CPT | Performed by: NURSE PRACTITIONER

## 2021-12-09 PROCEDURE — 0 TECHNETIUM SESTAMIBI: Performed by: SPECIALIST

## 2021-12-09 PROCEDURE — 93306 TTE W/DOPPLER COMPLETE: CPT

## 2021-12-09 PROCEDURE — 78452 HT MUSCLE IMAGE SPECT MULT: CPT

## 2021-12-09 PROCEDURE — 93018 CV STRESS TEST I&R ONLY: CPT | Performed by: SPECIALIST

## 2021-12-09 PROCEDURE — A9500 TC99M SESTAMIBI: HCPCS | Performed by: SPECIALIST

## 2021-12-09 PROCEDURE — 93306 TTE W/DOPPLER COMPLETE: CPT | Performed by: SPECIALIST

## 2021-12-09 PROCEDURE — 93017 CV STRESS TEST TRACING ONLY: CPT

## 2021-12-09 PROCEDURE — 78452 HT MUSCLE IMAGE SPECT MULT: CPT | Performed by: SPECIALIST

## 2021-12-09 RX ADMIN — TECHNETIUM TC 99M SESTAMIBI 1 DOSE: 1 INJECTION INTRAVENOUS at 08:55

## 2021-12-09 RX ADMIN — TECHNETIUM TC 99M SESTAMIBI 1 DOSE: 1 INJECTION INTRAVENOUS at 10:05

## 2022-01-10 ENCOUNTER — OFFICE VISIT (OUTPATIENT)
Dept: CARDIOLOGY | Facility: CLINIC | Age: 47
End: 2022-01-10

## 2022-01-10 VITALS
HEART RATE: 85 BPM | TEMPERATURE: 98 F | DIASTOLIC BLOOD PRESSURE: 91 MMHG | BODY MASS INDEX: 32.17 KG/M2 | WEIGHT: 174.8 LBS | SYSTOLIC BLOOD PRESSURE: 126 MMHG | HEIGHT: 62 IN | RESPIRATION RATE: 16 BRPM

## 2022-01-10 DIAGNOSIS — R00.2 PALPITATIONS: ICD-10-CM

## 2022-01-10 DIAGNOSIS — G47.33 OSA (OBSTRUCTIVE SLEEP APNEA): ICD-10-CM

## 2022-01-10 DIAGNOSIS — I10 ESSENTIAL HYPERTENSION: ICD-10-CM

## 2022-01-10 DIAGNOSIS — E78.5 HYPERLIPIDEMIA LDL GOAL <100: ICD-10-CM

## 2022-01-10 DIAGNOSIS — F17.211 CIGARETTE NICOTINE DEPENDENCE IN REMISSION: ICD-10-CM

## 2022-01-10 DIAGNOSIS — R07.2 PRECORDIAL CHEST PAIN: Primary | ICD-10-CM

## 2022-01-10 PROCEDURE — 99214 OFFICE O/P EST MOD 30 MIN: CPT | Performed by: SPECIALIST

## 2022-01-10 RX ORDER — ATENOLOL 25 MG/1
12.5 TABLET ORAL DAILY
Qty: 90 TABLET | Refills: 1 | Status: SHIPPED | OUTPATIENT
Start: 2022-01-10 | End: 2022-04-13 | Stop reason: SDUPTHER

## 2022-01-10 RX ORDER — ROSUVASTATIN CALCIUM 20 MG/1
20 TABLET, COATED ORAL DAILY
Qty: 90 TABLET | Refills: 1 | Status: SHIPPED | OUTPATIENT
Start: 2022-01-10 | End: 2022-04-13 | Stop reason: SDUPTHER

## 2022-01-10 RX ORDER — LOSARTAN POTASSIUM 50 MG/1
50 TABLET ORAL DAILY
Status: SHIPPED | COMMUNITY
Start: 2022-01-10 | End: 2022-01-10 | Stop reason: SDUPTHER

## 2022-01-10 RX ORDER — LOSARTAN POTASSIUM 50 MG/1
50 TABLET ORAL DAILY
Qty: 90 TABLET | Refills: 1 | Status: SHIPPED | OUTPATIENT
Start: 2022-01-10 | End: 2022-04-13 | Stop reason: SDUPTHER

## 2022-01-10 NOTE — PROGRESS NOTES
Subjective   Follow up, chest pain, hypertension  Jany Mendes is a 46 y.o. female who presents to day for Results (stress,echo and sleep study findings) and Med Management (list from phone).    CHIEF COMPLIANT  Chief Complaint   Patient presents with   • Results     stress,echo and sleep study findings   • Med Management     list from phone       Active Problems:  Problem List Items Addressed This Visit        Cardiac and Vasculature    Precordial chest pain - Primary    Palpitations    Essential hypertension    Relevant Medications    atenolol (TENORMIN) 25 MG tablet    losartan (COZAAR) 50 MG tablet    Hyperlipidemia LDL goal <100    Relevant Medications    rosuvastatin (CRESTOR) 20 MG tablet    Other Relevant Orders    Lipid Panel    Comprehensive Metabolic Panel       Sleep    CHIKA (obstructive sleep apnea)    Relevant Orders    Ambulatory Referral to Pulmonology       Tobacco    Cigarette nicotine dependence in remission          HPI  HPI  She is doing a lot better since she was seen back in November she had no further chest pain or shortness of breath no palpitations no edema no other complaints and she is 2 months smoking  PRIOR MEDS  Current Outpatient Medications on File Prior to Visit   Medication Sig Dispense Refill   • busPIRone (BUSPAR) 5 MG tablet Take 1 tablet by mouth 2 (two) times a day. 60 tablet 1   • levothyroxine (SYNTHROID, LEVOTHROID) 25 MCG tablet Take 25 mcg by mouth Daily.     • pantoprazole (PROTONIX) 40 MG EC tablet Take 1 tablet by mouth Daily. 30 tablet 0   • [DISCONTINUED] atenolol (TENORMIN) 25 MG tablet Take 12.5 mg by mouth Daily.     • [DISCONTINUED] losartan (COZAAR) 25 MG tablet Take 25 mg by mouth Daily.     • [DISCONTINUED] losartan (COZAAR) 50 MG tablet Take 1 tablet by mouth Daily.     • [DISCONTINUED] rosuvastatin (CRESTOR) 20 MG tablet Take 20 mg by mouth Daily.     • escitalopram (LEXAPRO) 10 MG tablet Take 10 mg by mouth Daily.     • folic acid-vit B6-vit B12 (FOLTABS)  "0.8-10-0.115 MG tablet tablet Take  by mouth Daily.       No current facility-administered medications on file prior to visit.       ALLERGIES  Patient has no known allergies.    HISTORY  Past Medical History:   Diagnosis Date   • Anxiety    • Hypertension    • Thyroid disease        Social History     Socioeconomic History   • Marital status:    Tobacco Use   • Smoking status: Former Smoker     Packs/day: 1.00     Types: Cigarettes     Quit date: 10/2021     Years since quittin.2   • Smokeless tobacco: Never Used   • Tobacco comment: States she hasn't in three weeks   Vaping Use   • Vaping Use: Never used   Substance and Sexual Activity   • Alcohol use: Yes     Comment: socially   • Drug use: Never       Family History   Problem Relation Age of Onset   • Breast cancer Mother 70   • Heart disease Father    • Heart disease Paternal Grandfather    • Heart attack Paternal Grandfather        Review of Systems   Respiratory: Negative for apnea, cough, choking, chest tightness, shortness of breath, wheezing and stridor.    Cardiovascular: Negative for chest pain, palpitations and leg swelling.       Objective     VITALS: /91   Pulse 85   Temp 98 °F (36.7 °C)   Resp 16   Ht 157.5 cm (62\")   Wt 79.3 kg (174 lb 12.8 oz)   BMI 31.97 kg/m²     LABS:   Lab Results (most recent)     None          IMAGING:   US Aorta Limited    Result Date: 10/20/2021  No sonographic findings of abdominal aortic aneurysm.  This report was finalized on 10/20/2021 3:58 PM by Dr. Hamilton Lindsay II, MD.        EXAM:  Physical Exam  Vitals reviewed.   Constitutional:       Appearance: She is well-developed.   HENT:      Head: Normocephalic and atraumatic.   Eyes:      Pupils: Pupils are equal, round, and reactive to light.   Neck:      Thyroid: No thyromegaly.      Vascular: No JVD.   Cardiovascular:      Rate and Rhythm: Normal rate and regular rhythm.      Heart sounds: Normal heart sounds. No murmur heard.  No friction rub. " No gallop.    Pulmonary:      Effort: Pulmonary effort is normal. No respiratory distress.      Breath sounds: Normal breath sounds. No stridor. No wheezing or rales.   Chest:      Chest wall: No tenderness.   Musculoskeletal:         General: No tenderness or deformity.      Cervical back: Neck supple.   Skin:     General: Skin is warm and dry.   Neurological:      Mental Status: She is alert and oriented to person, place, and time.      Cranial Nerves: No cranial nerve deficit.      Coordination: Coordination normal.         Procedure   Procedures       Assessment/Plan     Diagnoses and all orders for this visit:    1. Precordial chest pain (Primary)    2. Essential hypertension  -     atenolol (TENORMIN) 25 MG tablet; Take 0.5 tablets by mouth Daily.  Dispense: 90 tablet; Refill: 1  -     losartan (COZAAR) 50 MG tablet; Take 1 tablet by mouth Daily.  Dispense: 90 tablet; Refill: 1    3. Hyperlipidemia LDL goal <100  -     rosuvastatin (CRESTOR) 20 MG tablet; Take 1 tablet by mouth Daily.  Dispense: 90 tablet; Refill: 1  -     Lipid Panel; Future  -     Comprehensive Metabolic Panel; Future    4. Palpitations    5. Cigarette nicotine dependence in remission    6. CHIKA (obstructive sleep apnea)  -     Ambulatory Referral to Pulmonology    1.  She had no further chest pain whatsoever I discussed with her the result of the stress test which was negative for ischemia and her echocardiogram also unremarkable the only thing she had hypertensive response to exercise  2.  Her diastolic blood pressure is elevated, will increase his losartan to 50 mg/day  3.  Will repeat her lipid profile prior to next visit as her LDL was markedly elevated she is taking rosuvastatin 20 mg/day  4.  Discussed the results of the monitor which showed no arrhythmia  5.  She quit smoking in October third 2021 she is congratulated about that  6.  We discussed the results of the sleep study which showed an AHI of 6 we will refer her for CPAP  trial    Return in about 3 months (around 4/10/2022).             MEDS ORDERED DURING VISIT:  New Medications Ordered This Visit   Medications   • atenolol (TENORMIN) 25 MG tablet     Sig: Take 0.5 tablets by mouth Daily.     Dispense:  90 tablet     Refill:  1   • rosuvastatin (CRESTOR) 20 MG tablet     Sig: Take 1 tablet by mouth Daily.     Dispense:  90 tablet     Refill:  1   • losartan (COZAAR) 50 MG tablet     Sig: Take 1 tablet by mouth Daily.     Dispense:  90 tablet     Refill:  1       As always, Brittnee Magaña MD  I appreciate very much the opportunity to participate in the cardiovascular care of your patients. Please do not hesitate to call me with any questions with regards to Jany Mendes evaluation and management.         This document has been electronically signed by Liam Hensley MD  January 10, 2022 15:02 EST

## 2022-01-12 DIAGNOSIS — F41.1 GENERALIZED ANXIETY DISORDER: ICD-10-CM

## 2022-01-12 RX ORDER — BUSPIRONE HYDROCHLORIDE 5 MG/1
TABLET ORAL
Qty: 60 TABLET | Refills: 1 | OUTPATIENT
Start: 2022-01-12

## 2022-01-13 DIAGNOSIS — G47.33 OSA (OBSTRUCTIVE SLEEP APNEA): ICD-10-CM

## 2022-01-13 DIAGNOSIS — G47.33 OSA (OBSTRUCTIVE SLEEP APNEA): Primary | ICD-10-CM

## 2022-02-28 ENCOUNTER — OFFICE VISIT (OUTPATIENT)
Dept: PULMONOLOGY | Facility: CLINIC | Age: 47
End: 2022-02-28

## 2022-02-28 VITALS
WEIGHT: 168 LBS | SYSTOLIC BLOOD PRESSURE: 120 MMHG | HEIGHT: 62 IN | DIASTOLIC BLOOD PRESSURE: 82 MMHG | HEART RATE: 83 BPM | BODY MASS INDEX: 30.91 KG/M2 | OXYGEN SATURATION: 97 % | TEMPERATURE: 97.3 F

## 2022-02-28 DIAGNOSIS — E66.9 OBESITY (BMI 30-39.9): ICD-10-CM

## 2022-02-28 DIAGNOSIS — G47.33 OSA (OBSTRUCTIVE SLEEP APNEA): Primary | ICD-10-CM

## 2022-02-28 PROCEDURE — 99213 OFFICE O/P EST LOW 20 MIN: CPT | Performed by: NURSE PRACTITIONER

## 2022-03-01 PROBLEM — F17.210 CIGARETTE SMOKER: Status: RESOLVED | Noted: 2021-11-22 | Resolved: 2022-03-01

## 2022-04-13 ENCOUNTER — OFFICE VISIT (OUTPATIENT)
Dept: CARDIOLOGY | Facility: CLINIC | Age: 47
End: 2022-04-13

## 2022-04-13 VITALS
OXYGEN SATURATION: 97 % | DIASTOLIC BLOOD PRESSURE: 76 MMHG | WEIGHT: 168.2 LBS | BODY MASS INDEX: 30.95 KG/M2 | HEART RATE: 62 BPM | HEIGHT: 62 IN | SYSTOLIC BLOOD PRESSURE: 113 MMHG | TEMPERATURE: 97.5 F

## 2022-04-13 DIAGNOSIS — I10 ESSENTIAL HYPERTENSION: ICD-10-CM

## 2022-04-13 DIAGNOSIS — G47.33 OSA (OBSTRUCTIVE SLEEP APNEA): Primary | ICD-10-CM

## 2022-04-13 DIAGNOSIS — E78.5 HYPERLIPIDEMIA LDL GOAL <100: ICD-10-CM

## 2022-04-13 DIAGNOSIS — F17.211 CIGARETTE NICOTINE DEPENDENCE IN REMISSION: ICD-10-CM

## 2022-04-13 PROCEDURE — 99213 OFFICE O/P EST LOW 20 MIN: CPT | Performed by: NURSE PRACTITIONER

## 2022-04-13 PROCEDURE — 93000 ELECTROCARDIOGRAM COMPLETE: CPT | Performed by: NURSE PRACTITIONER

## 2022-04-13 RX ORDER — ATENOLOL 25 MG/1
12.5 TABLET ORAL DAILY
Qty: 90 TABLET | Refills: 1 | Status: SHIPPED | OUTPATIENT
Start: 2022-04-13 | End: 2022-10-17 | Stop reason: SDUPTHER

## 2022-04-13 RX ORDER — DEXTROAMPHETAMINE SACCHARATE, AMPHETAMINE ASPARTATE, DEXTROAMPHETAMINE SULFATE AND AMPHETAMINE SULFATE 2.5; 2.5; 2.5; 2.5 MG/1; MG/1; MG/1; MG/1
10 TABLET ORAL DAILY
COMMUNITY

## 2022-04-13 RX ORDER — LOSARTAN POTASSIUM 50 MG/1
50 TABLET ORAL DAILY
Qty: 90 TABLET | Refills: 1 | Status: SHIPPED | OUTPATIENT
Start: 2022-04-13 | End: 2022-10-17

## 2022-04-13 RX ORDER — ROSUVASTATIN CALCIUM 20 MG/1
20 TABLET, COATED ORAL DAILY
Qty: 90 TABLET | Refills: 1 | Status: SHIPPED | OUTPATIENT
Start: 2022-04-13

## 2022-04-13 NOTE — PROGRESS NOTES
Brittnee Magaña MD  Jany Mendes  1975  04/13/2022    Patient Active Problem List   Diagnosis   • Precordial chest pain   • Palpitations   • Abnormal EKG   • Essential hypertension   • Hyperlipidemia LDL goal <100   • Cigarette nicotine dependence in remission   • CHIKA (obstructive sleep apnea)     Dear Brittnee Magaña MD:    Subjective     Chief Complaint   Patient presents with   • Follow-up     3MO F/U, LABS   • Palpitations     NEW MED CHANGE, ADDERALL       History of Present Illness:    Jany Mendes is a 47 y.o. female with a past medical history of essential hypertension, hyperlipidemia, obstructive sleep apnea and history of tobacco abuse.  Patient presents today for routine cardiology follow-up.  Patient states she is been doing well since her last visit.  Denies any chest pain or shortness of breath.  She recently got her CPAP and states she has been wearing it 4 hours a night but has been struggling with wearing it all night.  Blood pressure has been controlled.  She brings in recent lab work today which shows controlled cholesterol and normal kidney function.  Echocardiogram in December 2021 showed normal LV function with borderline concentric hypertrophy.  Nuclear stress test in 2021 showed no evidence of ischemia.    No Known Allergies:      Current Outpatient Medications:   •  amphetamine-dextroamphetamine (ADDERALL) 10 MG tablet, Take 10 mg by mouth Daily., Disp: , Rfl:   •  atenolol (TENORMIN) 25 MG tablet, Take 0.5 tablets by mouth Daily., Disp: 90 tablet, Rfl: 1  •  busPIRone (BUSPAR) 5 MG tablet, Take 1 tablet by mouth 2 (two) times a day., Disp: 60 tablet, Rfl: 1  •  escitalopram (LEXAPRO) 10 MG tablet, Take 10 mg by mouth Daily., Disp: , Rfl:   •  levothyroxine (SYNTHROID, LEVOTHROID) 25 MCG tablet, Take 25 mcg by mouth Daily., Disp: , Rfl:   •  losartan (COZAAR) 50 MG tablet, Take 1 tablet by mouth Daily., Disp: 90 tablet, Rfl: 1  •  pantoprazole (PROTONIX) 40 MG EC tablet, Take 1  "tablet by mouth Daily., Disp: 30 tablet, Rfl: 0  •  rosuvastatin (CRESTOR) 20 MG tablet, Take 1 tablet by mouth Daily., Disp: 90 tablet, Rfl: 1  •  folic acid-vit B6-vit B12 (FOLTABS) 0.8-10-0.115 MG tablet tablet, Take  by mouth Daily., Disp: , Rfl:       The following portions of the patient's history were reviewed and updated as appropriate: allergies, current medications, past family history, past medical history, past social history, past surgical history and problem list.    Social History     Tobacco Use   • Smoking status: Former Smoker     Packs/day: 1.00     Types: Cigarettes     Quit date: 10/2021     Years since quittin.5   • Smokeless tobacco: Never Used   • Tobacco comment: States she hasn't in three weeks   Vaping Use   • Vaping Use: Never used   Substance Use Topics   • Alcohol use: Yes     Comment: socially   • Drug use: Never     Objective   Vitals:    22 1518   BP: 113/76   BP Location: Right arm   Patient Position: Sitting   Pulse: 62   Temp: 97.5 °F (36.4 °C)   SpO2: 97%   Weight: 76.3 kg (168 lb 3.2 oz)   Height: 157.5 cm (62\")     Body mass index is 30.76 kg/m².    Constitutional:       Appearance: Healthy appearance. Well-developed.   Eyes:      Pupils: Pupils are equal, round, and reactive to light.   HENT:      Head: Normocephalic.   Neck:      Lymphadenopathy: No cervical adenopathy.   Pulmonary:      Effort: Pulmonary effort is normal. No respiratory distress.      Breath sounds: Normal breath sounds. No wheezing.   Cardiovascular:      Normal rate. Regular rhythm. Normal S1. Normal S2.      . No S3 and S4 gallop.   Pulses:     Intact distal pulses.   Abdominal:      General: Bowel sounds are normal. There is no distension.      Palpations: Abdomen is soft.      Tenderness: There is no abdominal tenderness.   Musculoskeletal:      Cervical back: Normal range of motion. Skin:     General: Skin is warm and dry.      Findings: No erythema.   Neurological:      Mental Status: Alert, " oriented to person, place, and time and oriented to person, place and time.   Psychiatric:         Attention and Perception: Attention normal.         Mood and Affect: Mood normal.         Behavior: Behavior normal.         Thought Content: Thought content normal.         Lab Results   Component Value Date     03/03/2020    K 4.1 03/03/2020     03/03/2020    CO2 24.1 03/03/2020    BUN 10 03/03/2020    CREATININE 0.85 03/03/2020    GLUCOSE 111 (H) 03/03/2020    CALCIUM 10.2 03/03/2020    AST 19 03/03/2020    ALT 18 03/03/2020    ALKPHOS 96 03/03/2020    LABIL2 1.6 02/02/2015     No results found for: CKTOTAL  Lab Results   Component Value Date    WBC 14.04 (H) 03/03/2020    HGB 16.3 (H) 03/03/2020    HCT 47.5 (H) 03/03/2020     03/03/2020     No results found for: INR  No results found for: MG  No results found for: TSH, PSA, CHLPL, TRIG, HDL, LDL   No results found for: BNP          ECG 12 Lead    Date/Time: 4/13/2022 4:20 PM  Performed by: Seble Guo APRN  Authorized by: Seble Guo APRN   Comparison: compared with previous ECG   Similar to previous ECG  Rhythm: sinus rhythm  Rate: normal  Conduction: conduction normal  ST Segments: ST segments normal  T Waves: T waves normal    Clinical impression: normal ECG          Assessment/Plan    Diagnosis Plan   1. CHIKA (obstructive sleep apnea)     2. Essential hypertension  atenolol (TENORMIN) 25 MG tablet    losartan (COZAAR) 50 MG tablet   3. Hyperlipidemia LDL goal <100  rosuvastatin (CRESTOR) 20 MG tablet   4. Cigarette nicotine dependence in remission         Recommendations:    1. Essential hypertension  · Blood pressure has been well controlled.  Continue with atenolol and losartan at current doses.  Recent lab work from PCP showed normal kidney function.    2.  Hyperlipidemia  · Recent lipid panel from PCP showed improved cholesterol.  Continue with Crestor.    3.  Obstructive sleep apnea  · Patient recently started  using CPAP and is trying to be compliant with that however she states she only wears it about 4 hours a night.  Encouraged compliance.    4.  History of tobacco abuse  · Patient has stopped smoking.  She was congratulated on smoking cessation.    Return in about 6 months (around 10/13/2022).    As always, I appreciate very much the opportunity to participate in the cardiovascular care of your patients.      With Best Regards,    GEEN Marrufo

## 2022-10-17 ENCOUNTER — OFFICE VISIT (OUTPATIENT)
Dept: CARDIOLOGY | Facility: CLINIC | Age: 47
End: 2022-10-17

## 2022-10-17 VITALS
HEART RATE: 69 BPM | DIASTOLIC BLOOD PRESSURE: 82 MMHG | BODY MASS INDEX: 29.77 KG/M2 | WEIGHT: 161.8 LBS | HEIGHT: 62 IN | SYSTOLIC BLOOD PRESSURE: 146 MMHG

## 2022-10-17 DIAGNOSIS — R00.2 PALPITATIONS: ICD-10-CM

## 2022-10-17 DIAGNOSIS — I10 ESSENTIAL HYPERTENSION: ICD-10-CM

## 2022-10-17 DIAGNOSIS — G47.33 OSA (OBSTRUCTIVE SLEEP APNEA): ICD-10-CM

## 2022-10-17 DIAGNOSIS — E78.5 HYPERLIPIDEMIA LDL GOAL <100: Primary | ICD-10-CM

## 2022-10-17 PROCEDURE — 93000 ELECTROCARDIOGRAM COMPLETE: CPT | Performed by: NURSE PRACTITIONER

## 2022-10-17 PROCEDURE — 99213 OFFICE O/P EST LOW 20 MIN: CPT | Performed by: NURSE PRACTITIONER

## 2022-10-17 RX ORDER — LOSARTAN POTASSIUM 25 MG/1
25 TABLET ORAL DAILY
Qty: 30 TABLET | Refills: 5 | Status: SHIPPED | OUTPATIENT
Start: 2022-10-17 | End: 2023-03-06

## 2022-10-17 RX ORDER — ATENOLOL 25 MG/1
12.5 TABLET ORAL DAILY
Qty: 90 TABLET | Refills: 1 | Status: SHIPPED | OUTPATIENT
Start: 2022-10-17

## 2022-10-17 NOTE — PROGRESS NOTES
"                [facility]             [facility] GENE Guo Maria C., MD  Jany Mendes  1975  10/17/2022    Patient Active Problem List   Diagnosis   • Precordial chest pain   • Palpitations   • Abnormal EKG   • Essential hypertension   • Hyperlipidemia LDL goal <100   • Cigarette nicotine dependence in remission   • CHIKA (obstructive sleep apnea)       Dear Brittnee Magaña MD:    Subjective     Chief Complaint   Patient presents with   • Follow-up     ROUTINE   • Palpitations     \"FEELS WIGGLY\"       HPI:     This is a 47 y.o. female with known past medical history of essential hypertension, hyperlipidemia, obstructive sleep apnea and tobacco abuse.    Jany Mendes presents today for routine cardiology follow-up.  Patient states she has been doing overall well since her last visit.  Denies any chest pain, shortness of breath, lower extremity edema or syncope.  Does report intermittent palpitations that are infrequent.  Reports checking her blood pressure at home and having systolic blood pressures in the 90s therefore she cut her losartan back to 50 mg daily.  She brings in a blood pressure log today which shows systolic blood pressures from 100-110.  Denies wearing her CPAP at home.  States she was unable to tolerate it and it was making her lose sleep.  She brings in lab work from her PCP from July which is essentially unremarkable.  LDL 57.     • Diagnostic Testing  1. Cardiac event monitor 11/2021: No arrhythmias  2. Echocardiogram 12/2021: EF 66 to 70% with borderline concentric hypertrophy  3. Nuclear stress test 12/2021: negative for ischemia with hypertensive response to exercise     All other systems were reviewed and were negative.    Patient Active Problem List   Diagnosis   • Precordial chest pain   • Palpitations   • Abnormal EKG   • Essential hypertension   • Hyperlipidemia LDL goal <100   • Cigarette nicotine dependence in remission   • CHIKA " (obstructive sleep apnea)       family history includes Breast cancer (age of onset: 70) in her mother; Heart attack in her paternal grandfather; Heart disease in her father and paternal grandfather.     reports that she quit smoking about 12 months ago. Her smoking use included cigarettes. She smoked an average of 1 pack per day. She has never used smokeless tobacco. She reports current alcohol use. She reports that she does not use drugs.    No Known Allergies      Current Outpatient Medications:   •  atenolol (TENORMIN) 25 MG tablet, Take 0.5 tablets by mouth Daily., Disp: 90 tablet, Rfl: 1  •  busPIRone (BUSPAR) 5 MG tablet, Take 1 tablet by mouth 2 (two) times a day., Disp: 60 tablet, Rfl: 1  •  levothyroxine (SYNTHROID, LEVOTHROID) 25 MCG tablet, Take 25 mcg by mouth Daily., Disp: , Rfl:   •  losartan (COZAAR) 25 MG tablet, Take 1 tablet by mouth Daily., Disp: 30 tablet, Rfl: 5  •  pantoprazole (PROTONIX) 40 MG EC tablet, Take 1 tablet by mouth Daily., Disp: 30 tablet, Rfl: 0  •  rosuvastatin (CRESTOR) 20 MG tablet, Take 1 tablet by mouth Daily., Disp: 90 tablet, Rfl: 1  •  amphetamine-dextroamphetamine (ADDERALL) 10 MG tablet, Take 10 mg by mouth Daily., Disp: , Rfl:   •  escitalopram (LEXAPRO) 10 MG tablet, Take 10 mg by mouth Daily., Disp: , Rfl:   •  folic acid-vit B6-vit B12 (FOLTABS) 0.8-10-0.115 MG tablet tablet, Take  by mouth Daily., Disp: , Rfl:       Physical Exam:  I have reviewed the patient's current vital signs as documented in the patient's EMR.   Vitals:    10/17/22 0838   BP: 146/82   Pulse: 69     Body mass index is 29.59 kg/m².       10/17/22  0838   Weight: 73.4 kg (161 lb 12.8 oz)      Physical Exam  Constitutional:       General: She is not in acute distress.     Appearance: Normal appearance. She is well-developed and normal weight.   HENT:      Head: Normocephalic and atraumatic.   Eyes:      General: Lids are normal.      Conjunctiva/sclera: Conjunctivae normal.      Pupils: Pupils  are equal, round, and reactive to light.   Neck:      Vascular: No carotid bruit or JVD.   Cardiovascular:      Rate and Rhythm: Normal rate and regular rhythm.      Pulses: Normal pulses.           Radial pulses are 2+ on the right side and 2+ on the left side.        Dorsalis pedis pulses are 2+ on the right side and 2+ on the left side.        Posterior tibial pulses are 2+ on the right side and 2+ on the left side.      Heart sounds: Normal heart sounds, S1 normal and S2 normal. No murmur heard.  Pulmonary:      Effort: Pulmonary effort is normal. No respiratory distress.      Breath sounds: Normal breath sounds. No wheezing.   Abdominal:      General: Bowel sounds are normal. There is no distension.      Palpations: Abdomen is soft. There is no hepatomegaly or splenomegaly.      Tenderness: There is no abdominal tenderness.   Musculoskeletal:         General: No swelling. Normal range of motion.      Cervical back: Normal range of motion and neck supple.      Right lower leg: No edema.      Left lower leg: No edema.   Skin:     General: Skin is warm and dry.      Coloration: Skin is not jaundiced.      Findings: No rash.   Neurological:      General: No focal deficit present.      Mental Status: She is alert and oriented to person, place, and time. Mental status is at baseline.   Psychiatric:         Mood and Affect: Mood normal.         Speech: Speech normal.         Behavior: Behavior normal.         Thought Content: Thought content normal.         Judgment: Judgment normal.       DATA REVIEWED:     TTE/ANDREA:  Results for orders placed during the hospital encounter of 12/09/21    Adult Transthoracic Echo Complete w/ Color, Spectral and Contrast if necessary per protocol    Interpretation Summary  · Left ventricular wall thickness is consistent with borderline concentric hypertrophy.  · Left ventricular ejection fraction appears to be 66 - 70%. Left ventricular systolic function is normal.  · Left ventricular  diastolic function was normal.    Laboratory evaluations:    Lab Results   Component Value Date    GLUCOSE 111 (H) 03/03/2020    BUN 10 03/03/2020    CREATININE 0.85 03/03/2020    EGFRIFNONA 73 03/03/2020    BCR 11.8 03/03/2020    K 4.1 03/03/2020    CO2 24.1 03/03/2020    CALCIUM 10.2 03/03/2020    ALBUMIN 4.54 03/03/2020    LABIL2 1.6 02/02/2015    AST 19 03/03/2020    ALT 18 03/03/2020     Lab Results   Component Value Date    WBC 14.04 (H) 03/03/2020    HGB 16.3 (H) 03/03/2020    HCT 47.5 (H) 03/03/2020    MCV 95.6 03/03/2020     03/03/2020     No results found for: CHOL, CHLPL, TRIG, HDL, LDL, LDLDIRECT  No results found for: TSH, I0HLBGF, Q8CSTMD, THYROIDAB  No results found for: HGBA1C  Lab Results   Component Value Date    ALT 18 03/03/2020     No results found for: HGBA1C  Lab Results   Component Value Date    CREATININE 0.85 03/03/2020     No results found for: IRON, TIBC, FERRITIN  No results found for: INR, PROTIME          ECG 12 Lead    Date/Time: 10/17/2022 9:16 AM  Performed by: Seble Guo APRN  Authorized by: Seble Guo APRN   Comparison: compared with previous ECG   Similar to previous ECG  Rhythm: sinus rhythm and sinus arrhythmia  Conduction: conduction normal  ST Segments: ST segments normal  Other findings: non-specific ST-T wave changes    Clinical impression: non-specific ECG          --------------------------------------------------------------------------------------------------------------------------    ASSESSMENT/PLAN:      Diagnosis Plan   1. Hyperlipidemia LDL goal <100        2. Essential hypertension  losartan (COZAAR) 25 MG tablet    atenolol (TENORMIN) 25 MG tablet      3. Palpitations  ECG 12 Lead    ECG 12 Lead      4. CHIKA (obstructive sleep apnea)          1. Hyperlipidemia  • Patient had lipid panel in July 2022 which showed LDL at goal.  Continue with current dosing of statin.    2. Hypertension  • Patient reports she decreased her dose of  losartan to 25 mg p.o. daily due to loud systolic blood pressure.  She brings in a log today with systolic blood pressure ranging from 100-110.  Continue with losartan at reduced dose of 25 mg p.o. daily and atenolol at current dosing.  • Recent CMP showed normal kidney function.    3. Palpitations  · She reports infrequent palpitations.  Discussed with her about monitoring her palpitations and if they worsen can consider further work-up with repeat monitoring.  · Reduce caffeine intake    4. Sleep apnea  · Patient no longer wearing CPAP as she was unable to tolerate it.  Advised compliance.        This document has been @Electronically signed by GENE Marrufo, 10/17/22, 8:51 AM EDT.       Dictated Utilizing Dragon Dictation: Part of this note may be an electronic transcription/translation of spoken language to printed text using the Dragon Dictation System.    Follow-up appointment and medication changes provided in hand delivered After Visit Summary as well as reviewed in the room.

## 2022-12-15 DIAGNOSIS — I10 ESSENTIAL HYPERTENSION: ICD-10-CM

## 2022-12-15 RX ORDER — LOSARTAN POTASSIUM 50 MG/1
TABLET ORAL
Qty: 90 TABLET | Refills: 1 | Status: SHIPPED | OUTPATIENT
Start: 2022-12-15 | End: 2023-03-06

## 2022-12-29 ENCOUNTER — HOSPITAL ENCOUNTER (OUTPATIENT)
Dept: MAMMOGRAPHY | Facility: HOSPITAL | Age: 47
Discharge: HOME | End: 2022-12-29
Admitting: FAMILY MEDICINE

## 2022-12-29 DIAGNOSIS — Z12.31 VISIT FOR SCREENING MAMMOGRAM: ICD-10-CM

## 2022-12-29 PROCEDURE — 77063 BREAST TOMOSYNTHESIS BI: CPT | Performed by: RADIOLOGY

## 2022-12-29 PROCEDURE — 77063 BREAST TOMOSYNTHESIS BI: CPT

## 2022-12-29 PROCEDURE — 77067 SCR MAMMO BI INCL CAD: CPT

## 2022-12-29 PROCEDURE — 77067 SCR MAMMO BI INCL CAD: CPT | Performed by: RADIOLOGY

## 2023-03-01 DIAGNOSIS — I10 ESSENTIAL HYPERTENSION: ICD-10-CM

## 2023-03-06 RX ORDER — LOSARTAN POTASSIUM 25 MG/1
TABLET ORAL
Qty: 90 TABLET | Refills: 1 | Status: SHIPPED | OUTPATIENT
Start: 2023-03-06

## 2023-09-15 ENCOUNTER — TELEPHONE (OUTPATIENT)
Dept: CARDIOLOGY | Facility: CLINIC | Age: 48
End: 2023-09-15
Payer: COMMERCIAL

## 2023-09-15 NOTE — TELEPHONE ENCOUNTER
HUB CAN READ  Called pt to let her know that we can't send refills on her Losartan until she is seen in the office.

## 2023-10-30 DIAGNOSIS — I10 ESSENTIAL HYPERTENSION: ICD-10-CM

## 2023-10-30 RX ORDER — ATENOLOL 25 MG/1
12.5 TABLET ORAL DAILY
Qty: 45 TABLET | Refills: 3 | Status: SHIPPED | OUTPATIENT
Start: 2023-10-30

## 2025-02-26 ENCOUNTER — TELEPHONE (OUTPATIENT)
Dept: CARDIOLOGY | Facility: CLINIC | Age: 50
End: 2025-02-26
Payer: COMMERCIAL

## 2025-02-26 NOTE — TELEPHONE ENCOUNTER
Caller: Jany Mendes    Relationship to patient: Self    Best call back number: [phone] (home)     Chief complaint: PINCHING IN CHEST - NO CARDIAC SYMPTOMS AT TIME OF CALL TO HUB    Type of visit: F/U    Requested date: ASAP    Additional notes: NO SCHEDULING TIMEFRAME IN CHART FOR HUB TO SCHEDULE APPT.

## 2025-03-05 ENCOUNTER — OFFICE VISIT (OUTPATIENT)
Dept: CARDIOLOGY | Facility: CLINIC | Age: 50
End: 2025-03-05
Payer: COMMERCIAL

## 2025-03-05 VITALS
WEIGHT: 151 LBS | OXYGEN SATURATION: 97 % | HEART RATE: 92 BPM | DIASTOLIC BLOOD PRESSURE: 82 MMHG | SYSTOLIC BLOOD PRESSURE: 123 MMHG | BODY MASS INDEX: 27.79 KG/M2 | HEIGHT: 62 IN

## 2025-03-05 DIAGNOSIS — I10 ESSENTIAL HYPERTENSION: ICD-10-CM

## 2025-03-05 DIAGNOSIS — R07.2 PRECORDIAL CHEST PAIN: Primary | ICD-10-CM

## 2025-03-05 DIAGNOSIS — G47.33 OSA (OBSTRUCTIVE SLEEP APNEA): ICD-10-CM

## 2025-03-05 DIAGNOSIS — E78.5 HYPERLIPIDEMIA LDL GOAL <100: ICD-10-CM

## 2025-03-05 PROCEDURE — 99214 OFFICE O/P EST MOD 30 MIN: CPT | Performed by: NURSE PRACTITIONER

## 2025-03-05 PROCEDURE — 93000 ELECTROCARDIOGRAM COMPLETE: CPT | Performed by: NURSE PRACTITIONER

## 2025-03-05 RX ORDER — SODIUM CHLORIDE 0.9 % (FLUSH) 0.9 %
10 SYRINGE (ML) INJECTION EVERY 12 HOURS SCHEDULED
OUTPATIENT
Start: 2025-03-05

## 2025-03-05 RX ORDER — METHYLPHENIDATE HYDROCHLORIDE 20 MG/1
1 CAPSULE, EXTENDED RELEASE ORAL DAILY
COMMUNITY
Start: 2025-02-07

## 2025-03-05 RX ORDER — METOPROLOL TARTRATE 50 MG
50 TABLET ORAL ONCE
Qty: 1 TABLET | Refills: 0 | Status: SHIPPED | OUTPATIENT
Start: 2025-03-05 | End: 2025-03-05

## 2025-03-05 RX ORDER — SODIUM CHLORIDE 0.9 % (FLUSH) 0.9 %
10 SYRINGE (ML) INJECTION AS NEEDED
OUTPATIENT
Start: 2025-03-05

## 2025-03-05 RX ORDER — MINOXIDIL 2.5 MG/1
2.5 TABLET ORAL DAILY
COMMUNITY
Start: 2025-01-24

## 2025-03-05 RX ORDER — NITROGLYCERIN 0.4 MG/1
0.4 TABLET SUBLINGUAL
OUTPATIENT
Start: 2025-03-05 | End: 2025-03-05

## 2025-03-05 RX ORDER — SODIUM CHLORIDE 9 MG/ML
40 INJECTION, SOLUTION INTRAVENOUS AS NEEDED
OUTPATIENT
Start: 2025-03-05

## 2025-03-05 RX ORDER — SPIRONOLACTONE 100 MG/1
100 TABLET, FILM COATED ORAL DAILY
COMMUNITY
Start: 2024-12-05

## 2025-03-05 NOTE — PROGRESS NOTES
[facility]             SebleGENE Osorio Maria C., MD  Jany Mendes  1975  03/05/2025    Patient Active Problem List   Diagnosis    Precordial chest pain    Palpitations    Abnormal EKG    Essential hypertension    Hyperlipidemia LDL goal <100    Cigarette nicotine dependence in remission    CHIKA (obstructive sleep apnea)       Dear Brittnee Magaña MD:    Subjective     Chief Complaint   Patient presents with    Chest Pain    Arm Pain     Patient states feels like pinching       HPI:     This is a 49 y.o. female with known past medical history of essential hypertension, hyperlipidemia, obstructive sleep apnea and tobacco abuse.     Jany Mendes presents today for routine cardiology follow up.   History of Present Illness    She reports experiencing a pinching sensation in the middle of her chest, which initially occurred last week during the night and recurred the following day. The episodes are brief, lasting only a second or two, but have happened multiple times. She estimates that she experienced this sensation approximately 8 times over the course of 2 days, with a decrease in frequency to 4 or 5 times since then. She does not report any associated numbness in her arm but describes an unusual sensation. She is unable to identify any specific triggers for these episodes, such as physical activity, stress, or deep breathing.    She has a history of high blood pressure and high cholesterol. Her last laboratory work was conducted in June or July 2024, and she is scheduled for another round this month. She has observed a significant improvement in her cholesterol levels and has made lifestyle modifications, including smoking cessation and weight loss. She is considering discontinuing her cholesterol medication if her levels remain stable. She is currently on atenolol and Crestor 20 mg. She has discontinued losartan and is taking spironolactone 100 mg  for hair loss, as prescribed by her dermatologist.  She does have a family history of premature coronary artery disease.  Dad had MI in his 40s      MEDICATIONS  Current: Atenolol, Crestor, spironolactone  Discontinued: Losartan       Diagnostic Testing  Cardiac event monitor 11/2021: No arrhythmias  Echocardiogram 12/2021: EF 66 to 70% with borderline concentric hypertrophy  Nuclear stress test 12/2021: negative for ischemia with hypertensive response to exercise       All other systems were reviewed and were negative.    Patient Active Problem List   Diagnosis    Precordial chest pain    Palpitations    Abnormal EKG    Essential hypertension    Hyperlipidemia LDL goal <100    Cigarette nicotine dependence in remission    CHIKA (obstructive sleep apnea)       family history includes Breast cancer (age of onset: 70) in her mother; Heart attack in her paternal grandfather; Heart disease in her father and paternal grandfather.     reports that she quit smoking about 3 years ago. Her smoking use included cigarettes. She has never used smokeless tobacco. She reports current alcohol use. She reports that she does not use drugs.    No Known Allergies      Current Outpatient Medications:     atenolol (TENORMIN) 25 MG tablet, TAKE 1/2 TABLET BY MOUTH EVERY DAY, Disp: 45 tablet, Rfl: 3    busPIRone (BUSPAR) 5 MG tablet, Take 1 tablet by mouth 2 (two) times a day., Disp: 60 tablet, Rfl: 1    escitalopram (LEXAPRO) 10 MG tablet, Take 1 tablet by mouth Daily., Disp: , Rfl:     folic acid-vit B6-vit B12 (FOLTABS) 0.8-10-0.115 MG tablet tablet, Take  by mouth Daily., Disp: , Rfl:     levothyroxine (SYNTHROID, LEVOTHROID) 25 MCG tablet, Take 1 tablet by mouth Daily., Disp: , Rfl:     methylphenidate LA (RITALIN LA) 20 MG 24 hr capsule, Take 1 capsule by mouth Daily, Disp: , Rfl:     minoxidil (LONITEN) 2.5 MG tablet, Take 1 tablet by mouth Daily., Disp: , Rfl:     pantoprazole (PROTONIX) 40 MG EC tablet, Take 1 tablet by mouth  Daily., Disp: 30 tablet, Rfl: 0    rosuvastatin (CRESTOR) 20 MG tablet, Take 1 tablet by mouth Daily., Disp: 90 tablet, Rfl: 1    spironolactone (ALDACTONE) 100 MG tablet, Take 1 tablet by mouth Daily., Disp: , Rfl:     amphetamine-dextroamphetamine (ADDERALL) 10 MG tablet, Take 10 mg by mouth Daily. (Patient not taking: Reported on 3/5/2025), Disp: , Rfl:     metoprolol tartrate (LOPRESSOR) 50 MG tablet, Take 1 tablet by mouth 1 (One) Time for 1 dose. Take 50 mg One (1) Hour Prior to Coronary CTA, Disp: 1 tablet, Rfl: 0      Physical Exam:  I have reviewed the patient's current vital signs as documented in the patient's EMR.   Vitals:    03/05/25 1008   BP: 123/82   Pulse: 92   SpO2: 97%     Body mass index is 27.61 kg/m².       03/05/25  1008   Weight: 68.5 kg (151 lb)      Physical Exam  Constitutional:       General: She is not in acute distress.     Appearance: Normal appearance. She is well-developed and normal weight.   HENT:      Head: Normocephalic and atraumatic.   Eyes:      General: Lids are normal.      Conjunctiva/sclera: Conjunctivae normal.      Pupils: Pupils are equal, round, and reactive to light.   Neck:      Vascular: No carotid bruit or JVD.   Cardiovascular:      Rate and Rhythm: Normal rate and regular rhythm.      Pulses: Normal pulses.      Heart sounds: Normal heart sounds, S1 normal and S2 normal. No murmur heard.  Pulmonary:      Effort: Pulmonary effort is normal. No respiratory distress.      Breath sounds: Normal breath sounds. No wheezing.   Abdominal:      General: Bowel sounds are normal. There is no distension.      Palpations: Abdomen is soft. There is no hepatomegaly or splenomegaly.      Tenderness: There is no abdominal tenderness.   Musculoskeletal:         General: No swelling. Normal range of motion.      Cervical back: Normal range of motion and neck supple.      Right lower leg: No edema.      Left lower leg: No edema.   Skin:     General: Skin is warm and dry.       "Coloration: Skin is not jaundiced.      Findings: No rash.   Neurological:      General: No focal deficit present.      Mental Status: She is alert and oriented to person, place, and time. Mental status is at baseline.   Psychiatric:         Mood and Affect: Mood normal.         Speech: Speech normal.         Behavior: Behavior normal.         Thought Content: Thought content normal.         Judgment: Judgment normal.          DATA REVIEWED:     TTE/ANDREA:  Results for orders placed during the hospital encounter of 12/09/21    Adult Transthoracic Echo Complete w/ Color, Spectral and Contrast if necessary per protocol    Interpretation Summary  · Left ventricular wall thickness is consistent with borderline concentric hypertrophy.  · Left ventricular ejection fraction appears to be 66 - 70%. Left ventricular systolic function is normal.  · Left ventricular diastolic function was normal.      Laboratory evaluations:    Lab Results   Component Value Date    GLUCOSE 111 (H) 03/03/2020    BUN 10 03/03/2020    CREATININE 0.85 03/03/2020    EGFRIFNONA 73 03/03/2020    BCR 11.8 03/03/2020    K 4.1 03/03/2020    CO2 24.1 03/03/2020    CALCIUM 10.2 03/03/2020    ALBUMIN 4.54 03/03/2020    AST 19 03/03/2020    ALT 18 03/03/2020     Lab Results   Component Value Date    WBC 14.04 (H) 03/03/2020    HGB 16.3 (H) 03/03/2020    HCT 47.5 (H) 03/03/2020    MCV 95.6 03/03/2020     03/03/2020     No results found for: \"CHOL\", \"CHLPL\", \"TRIG\", \"HDL\", \"LDL\", \"LDLDIRECT\"  No results found for: \"TSH\", \"S9FEJIP\", \"A6MQFMV\", \"THYROIDAB\"  No results found for: \"HGBA1C\"  Lab Results   Component Value Date    ALT 18 03/03/2020     No results found for: \"HGBA1C\"  Lab Results   Component Value Date    CREATININE 0.85 03/03/2020     No results found for: \"IRON\", \"TIBC\", \"FERRITIN\"  No results found for: \"INR\", \"PROTIME\"   No components found for: \"ABSOLUTELUNG\"    ECG 12 Lead    Date/Time: 3/5/2025 11:07 AM  Performed by: Seble Guo " GENE Aleman    Authorized by: Seble Guo APRN  Comparison: compared with previous ECG   Rhythm: sinus rhythm  Rate: normal    Clinical impression: non-specific ECG         --------------------------------------------------------------------------------------------------------------------------    ASSESSMENT/PLAN:      Diagnosis Plan   1. Precordial chest pain  CT Angiogram Coronary    nitroglycerin (NITROSTAT) SL tablet 0.4 mg    No Caffeine or Nicotine 4 Hours Prior to CTA Appointment    Nothing to Eat or Drink 4 Hours Prior to CTA Appointment    Do Not Take Phosphodiasterase Inhibitors in the 72 Hours Prior to Coronary CTA    Obtain Informed Consent - Computed Tomography Angiography of Chest - Angiogram of Coronary Arteries    Vital Signs Upon Arrival    Cardiac Monitoring    Verify NPO Status - Patient to Be NPO at Least 4 Hours Prior to CTA    Notify CT After Administration of metoprolol tartrate (LOPRESSOR) tablet    Notify Provider If Total Metoprolol Given Equals 300mg & Heart Rate Not At Goal    Notify Provider Prior to Administration of Nitroglycerin if Patient SBP <80    POC Creatinine    Insert Peripheral IV    Saline Lock & Maintain IV Access    sodium chloride 0.9 % flush 10 mL    sodium chloride 0.9 % flush 10 mL    sodium chloride 0.9 % infusion 40 mL    Vital Signs - See Instructions    Hold Medication Metformin (Glucophage, Glucophage XR, Fortament, Glumetza);  Metglip (metformin/glipizide);  Glucovance (metformin/glyburide); Avandamet (metformin/rosiglitazone)    Patient May Discharge Home After Procedure Complete (If Stable)    metoprolol tartrate (LOPRESSOR) 50 MG tablet      2. Essential hypertension        3. CHIKA (obstructive sleep apnea)          Assessment & Plan  1. Chest discomfort.  Patient with some typical and atypical chest pain.  Multiple risk factors for coronary artery disease along with family history of premature coronary artery disease in her dad who had an MI in  his 40s.  Had nuclear stress test in 2021 which was negative for ischemia.  Will proceed with CT coronary angiogram to evaluate further.    2. Hypertension  Her blood pressure readings today are within the normal range. She is currently on atenolol and spironolactone 100 mg, which was initially prescribed for hair loss.  Labs requested from PCP    3. Hypercholesterolemia  She reports that her cholesterol levels have improved since quitting smoking and losing weight. She is currently on Crestor 20 mg. Recent lab results from her family doctor will be reviewed to assess current cholesterol levels.       This document has been @Electronically signed by GENE Marrufo, 03/05/25, 10:08 AM EST.       Dictated Utilizing Dragon Dictation: Part of this note may be an electronic transcription/translation of spoken language to printed text using the Dragon Dictation System.    Patient or patient representative verbalized consent for the use of Ambient Listening during the visit with  GENE Marrufo for chart documentation. 3/5/2025  11:03 EST    Follow-up appointment and medication changes provided in hand delivered After Visit Summary as well as reviewed in the room.

## 2025-03-06 ENCOUNTER — TELEPHONE (OUTPATIENT)
Dept: CARDIOLOGY | Facility: CLINIC | Age: 50
End: 2025-03-06
Payer: COMMERCIAL

## 2025-03-06 NOTE — TELEPHONE ENCOUNTER
Faxed request for labs.               ----- Message from Seble Guo sent at 3/5/2025 10:28 AM EST -----  Please request labs from PCP

## 2025-03-30 ENCOUNTER — HOSPITAL ENCOUNTER (OUTPATIENT)
Dept: CT IMAGING | Facility: HOSPITAL | Age: 50
Discharge: HOME | End: 2025-03-30
Admitting: NURSE PRACTITIONER
Payer: COMMERCIAL

## 2025-03-30 VITALS — DIASTOLIC BLOOD PRESSURE: 72 MMHG | HEART RATE: 109 BPM | SYSTOLIC BLOOD PRESSURE: 122 MMHG

## 2025-03-30 DIAGNOSIS — R07.2 PRECORDIAL CHEST PAIN: ICD-10-CM

## 2025-03-30 PROCEDURE — 25510000001 IOPAMIDOL PER 1 ML: Performed by: NURSE PRACTITIONER

## 2025-03-30 PROCEDURE — 75574 CT ANGIO HRT W/3D IMAGE: CPT

## 2025-03-30 PROCEDURE — 82565 ASSAY OF CREATININE: CPT

## 2025-03-30 RX ORDER — IOPAMIDOL 755 MG/ML
100 INJECTION, SOLUTION INTRAVASCULAR
Status: COMPLETED | OUTPATIENT
Start: 2025-03-30 | End: 2025-03-30

## 2025-03-30 RX ADMIN — IOPAMIDOL 90 ML: 755 INJECTION, SOLUTION INTRAVENOUS at 14:44

## 2025-04-02 LAB — CREAT BLDA-MCNC: 1 MG/DL (ref 0.6–1.3)

## 2025-04-09 ENCOUNTER — TELEPHONE (OUTPATIENT)
Dept: CARDIOLOGY | Facility: CLINIC | Age: 50
End: 2025-04-09

## 2025-04-09 ENCOUNTER — OFFICE VISIT (OUTPATIENT)
Dept: CARDIOLOGY | Facility: CLINIC | Age: 50
End: 2025-04-09
Payer: COMMERCIAL

## 2025-04-09 VITALS
WEIGHT: 151 LBS | OXYGEN SATURATION: 100 % | HEIGHT: 62 IN | HEART RATE: 87 BPM | DIASTOLIC BLOOD PRESSURE: 73 MMHG | BODY MASS INDEX: 27.79 KG/M2 | SYSTOLIC BLOOD PRESSURE: 101 MMHG

## 2025-04-09 DIAGNOSIS — R07.2 PRECORDIAL CHEST PAIN: Primary | ICD-10-CM

## 2025-04-09 DIAGNOSIS — E78.5 HYPERLIPIDEMIA LDL GOAL <100: ICD-10-CM

## 2025-04-09 PROCEDURE — 99214 OFFICE O/P EST MOD 30 MIN: CPT | Performed by: NURSE PRACTITIONER

## 2025-04-09 NOTE — PROGRESS NOTES
[facility]             Seble GENE Guo Ingrid, PA  Jany Mendes  1975  04/09/2025    Patient Active Problem List   Diagnosis    Precordial chest pain    Palpitations    Abnormal EKG    Essential hypertension    Hyperlipidemia LDL goal <100    Cigarette nicotine dependence in remission    CHIKA (obstructive sleep apnea)       Dear Tasha Lucas PA:    Subjective     Chief Complaint   Patient presents with    Follow-up       HPI:     This is a 50 y.o. female with known past medical history of essential hypertension, hyperlipidemia, obstructive sleep apnea and tobacco abuse.        Jany Mendes presents today for routine cardiology follow up.   History of Present Illness    She underwent laboratory testing on Monday, which revealed an increase in her cholesterol levels per her PCP and was instructed to continue statin. She has been on statin therapy for several years but admits to inconsistent adherence.  Underwent CT coronary angiogram for chest pain which showed total calcium score of 11 with no significant stenosis.  Blood pressure has been controlled.     Diagnostic Testing  Cardiac event monitor 11/2021: No arrhythmias  Echocardiogram 12/2021: EF 66 to 70% with borderline concentric hypertrophy  Nuclear stress test 12/2021: negative for ischemia with hypertensive response to exercise       All other systems were reviewed and were negative.    Patient Active Problem List   Diagnosis    Precordial chest pain    Palpitations    Abnormal EKG    Essential hypertension    Hyperlipidemia LDL goal <100    Cigarette nicotine dependence in remission    CHIKA (obstructive sleep apnea)       family history includes Breast cancer (age of onset: 70) in her mother; Heart attack in her paternal grandfather; Heart disease in her father and paternal grandfather.     reports that she quit smoking about 3 years ago. Her smoking use included cigarettes. She has never used  smokeless tobacco. She reports current alcohol use. She reports that she does not use drugs.    No Known Allergies      Current Outpatient Medications:     amphetamine-dextroamphetamine (ADDERALL) 10 MG tablet, Take 1 tablet by mouth Daily., Disp: , Rfl:     atenolol (TENORMIN) 25 MG tablet, TAKE 1/2 TABLET BY MOUTH EVERY DAY, Disp: 45 tablet, Rfl: 3    busPIRone (BUSPAR) 5 MG tablet, Take 1 tablet by mouth 2 (two) times a day., Disp: 60 tablet, Rfl: 1    escitalopram (LEXAPRO) 10 MG tablet, Take 1 tablet by mouth Daily., Disp: , Rfl:     folic acid-vit B6-vit B12 (FOLTABS) 0.8-10-0.115 MG tablet tablet, Take  by mouth Daily., Disp: , Rfl:     levothyroxine (SYNTHROID, LEVOTHROID) 25 MCG tablet, Take 1 tablet by mouth Daily., Disp: , Rfl:     methylphenidate LA (RITALIN LA) 20 MG 24 hr capsule, Take 1 capsule by mouth Daily, Disp: , Rfl:     minoxidil (LONITEN) 2.5 MG tablet, Take 1 tablet by mouth Daily., Disp: , Rfl:     pantoprazole (PROTONIX) 40 MG EC tablet, Take 1 tablet by mouth Daily., Disp: 30 tablet, Rfl: 0    rosuvastatin (CRESTOR) 20 MG tablet, Take 1 tablet by mouth Daily., Disp: 90 tablet, Rfl: 1    spironolactone (ALDACTONE) 100 MG tablet, Take 1 tablet by mouth Daily., Disp: , Rfl:       Physical Exam:  I have reviewed the patient's current vital signs as documented in the patient's EMR.   Vitals:    04/09/25 1046   BP: 101/73   Pulse: 87   SpO2: 100%     Body mass index is 27.61 kg/m².       04/09/25  1046   Weight: 68.5 kg (151 lb)      Physical Exam  Constitutional:       General: She is not in acute distress.     Appearance: Normal appearance. She is well-developed and normal weight.   HENT:      Head: Normocephalic and atraumatic.   Eyes:      General: Lids are normal.      Conjunctiva/sclera: Conjunctivae normal.      Pupils: Pupils are equal, round, and reactive to light.   Neck:      Vascular: No carotid bruit or JVD.   Cardiovascular:      Rate and Rhythm: Normal rate and regular rhythm.       Pulses: Normal pulses.      Heart sounds: Normal heart sounds, S1 normal and S2 normal. No murmur heard.  Pulmonary:      Effort: Pulmonary effort is normal. No respiratory distress.      Breath sounds: Normal breath sounds. No wheezing.   Abdominal:      General: Bowel sounds are normal. There is no distension.      Palpations: Abdomen is soft. There is no hepatomegaly or splenomegaly.      Tenderness: There is no abdominal tenderness.   Musculoskeletal:         General: No swelling. Normal range of motion.      Cervical back: Normal range of motion and neck supple.      Right lower leg: No edema.      Left lower leg: No edema.   Skin:     General: Skin is warm and dry.      Coloration: Skin is not jaundiced.      Findings: No rash.   Neurological:      General: No focal deficit present.      Mental Status: She is alert and oriented to person, place, and time. Mental status is at baseline.   Psychiatric:         Mood and Affect: Mood normal.         Speech: Speech normal.         Behavior: Behavior normal.         Thought Content: Thought content normal.         Judgment: Judgment normal.            DATA REVIEWED:     TTE/ANDREA:  Results for orders placed during the hospital encounter of 12/09/21    Adult Transthoracic Echo Complete w/ Color, Spectral and Contrast if necessary per protocol    Interpretation Summary  · Left ventricular wall thickness is consistent with borderline concentric hypertrophy.  · Left ventricular ejection fraction appears to be 66 - 70%. Left ventricular systolic function is normal.  · Left ventricular diastolic function was normal.      Laboratory evaluations:    Lab Results   Component Value Date    GLUCOSE 111 (H) 03/03/2020    BUN 10 03/03/2020    CREATININE 1.00 03/30/2025    EGFRIFNONA 73 03/03/2020    BCR 11.8 03/03/2020    K 4.1 03/03/2020    CO2 24.1 03/03/2020    CALCIUM 10.2 03/03/2020    ALBUMIN 4.54 03/03/2020    AST 19 03/03/2020    ALT 18 03/03/2020     Lab Results  "  Component Value Date    WBC 14.04 (H) 03/03/2020    HGB 16.3 (H) 03/03/2020    HCT 47.5 (H) 03/03/2020    MCV 95.6 03/03/2020     03/03/2020     No results found for: \"CHOL\", \"CHLPL\", \"TRIG\", \"HDL\", \"LDL\", \"LDLDIRECT\"  No results found for: \"TSH\", \"T2AEEMG\", \"P6LGYVV\", \"THYROIDAB\"  No results found for: \"HGBA1C\"  Lab Results   Component Value Date    ALT 18 03/03/2020     No results found for: \"HGBA1C\"  Lab Results   Component Value Date    CREATININE 1.00 03/30/2025     No results found for: \"IRON\", \"TIBC\", \"FERRITIN\"  No results found for: \"INR\", \"PROTIME\"   No components found for: \"ABSOLUTELUNG\"    --------------------------------------------------------------------------------------------------------------------------    ASSESSMENT/PLAN:      Diagnosis Plan   1. Precordial chest pain        2. Hyperlipidemia LDL goal <100          Assessment & Plan  Chest pain  Hyperlipidemia  Patient with some typical and atypical chest pain.  Echocardiogram in 2021 showed normal LV function.  Recent CT coronary angiogram showed total calcium score of 11 with no significant stenosis.  Patient currently on Crestor 20 mg daily although states she does not take it regularly like she should as she is sometimes forgetful.  Had recent lipid panel by family doctor which is not available for review today.  Would strive for LDL goal less than 70.    3.  Hypertension  Her blood pressure readings today are within the normal range. She is currently on atenolol and spironolactone 100 mg, which was initially prescribed for hair loss.  Labs requested from PCP    This document has been @Electronically signed by GENE Marrufo, 04/09/25, 9:53 AM EDT.       Dictated Utilizing Dragon Dictation: Part of this note may be an electronic transcription/translation of spoken language to printed text using the Dragon Dictation System.  Patient or patient representative verbalized consent for the use of Ambient Listening during the visit " with  GENE Marrufo for chart documentation. 4/9/2025  11:31 EDT    Follow-up appointment and medication changes provided in hand delivered After Visit Summary as well as reviewed in the room.

## 2025-05-05 ENCOUNTER — TRANSCRIBE ORDERS (OUTPATIENT)
Dept: ADMINISTRATIVE | Facility: HOSPITAL | Age: 50
End: 2025-05-05
Payer: COMMERCIAL

## 2025-05-05 DIAGNOSIS — Z12.31 VISIT FOR SCREENING MAMMOGRAM: Primary | ICD-10-CM

## 2025-06-06 ENCOUNTER — HOSPITAL ENCOUNTER (OUTPATIENT)
Dept: MAMMOGRAPHY | Facility: HOSPITAL | Age: 50
Discharge: HOME | End: 2025-06-06
Admitting: INTERNAL MEDICINE
Payer: COMMERCIAL

## 2025-06-06 DIAGNOSIS — Z12.31 VISIT FOR SCREENING MAMMOGRAM: ICD-10-CM

## 2025-06-06 PROCEDURE — 77067 SCR MAMMO BI INCL CAD: CPT

## 2025-06-06 PROCEDURE — 77063 BREAST TOMOSYNTHESIS BI: CPT | Performed by: RADIOLOGY

## 2025-06-06 PROCEDURE — 77063 BREAST TOMOSYNTHESIS BI: CPT

## 2025-06-06 PROCEDURE — 77067 SCR MAMMO BI INCL CAD: CPT | Performed by: RADIOLOGY
